# Patient Record
Sex: MALE | Race: WHITE | NOT HISPANIC OR LATINO | Employment: FULL TIME | URBAN - METROPOLITAN AREA
[De-identification: names, ages, dates, MRNs, and addresses within clinical notes are randomized per-mention and may not be internally consistent; named-entity substitution may affect disease eponyms.]

---

## 2024-05-05 NOTE — PROGRESS NOTES
ANNUAL VISIT NOTE     PRESENTING HISTORY     Reason for Visit:  Annual visit.    No chief complaint on file.    History of Present Illness & ROS: Mr. Skip Husain is a 33 y.o. male.  Annual   Very pleasant gentleman.   RN at [employer].   Formerly with [employer], [facility], [facility].   He and his wife are expecting their 2nd son on Thursday.   No complaints.   He tried a 'cleansing' product from [employer] for the 'biliary' issues that was identified on 3/2024 at [facility], and 'feels this has helped and worked well for him'.   In addition, he has not been seen by Cardiology at [facility]. Was evaluated at [facility], but has not undergone stress testing. He has not been consistent with taking the Crestor, due to concerns for 'side effects, but has been taking the CoQ10' tablets.   Denies need for refills at this time.   Reports having '2 weeks remaining and will request refill when needing more'..     Review of Systems:  Eyes: denies visual changes at this time denies floaters   ENT: no nasal congestion or sore throat  Respiratory: no cough or shorness of breath  Cardiovascular: no chest pain or palpitations  Gastrointestinal: no nausea or vomiting, no abdominal pain or change in bowel habits  Genitourinary: no hematuria or dysuria; denies frequency  Hematologic/Lymphatic: no easy bruising or lymphadenopathy  Musculoskeletal: no arthralgias or myalgias  Neurological: no seizures or tremors  Endocrine: no heat or cold intolerance    PAST HISTORY:     No past medical history on file.    No past surgical history on file.    No family history on file.    Social History     Socioeconomic History    Marital status:      Social Determinants of Health     Financial Resource Strain: Low Risk  (10/2/2023)    Received from [facility]    Overall Financial Resource Strain (CARDIA)     Difficulty of Paying Living Expenses: Not hard at all   Food Insecurity: No Food Insecurity (10/2/2023)    Received from [facility]    Hunger  Vital Sign     Worried About Running Out of Food in the Last Year: Never true     Ran Out of Food in the Last Year: Never true   Transportation Needs: No Transportation Needs (10/2/2023)    Received from [facility]    PRAPARE - Transportation     Lack of Transportation (Medical): No     Lack of Transportation (Non-Medical): No   Physical Activity: Sufficiently Active (10/2/2023)    Received from [facility]    Exercise Vital Sign     Days of Exercise per Week: 4 days     Minutes of Exercise per Session: 60 min   Stress: No Stress Concern Present (10/2/2023)    Received from [facility]    [employer] [place] [employer] - Occupational Stress Questionnaire     Feeling of Stress : Not at all       MEDICATIONS & ALLERGIES:     No current outpatient medications on file prior to visit.     No current facility-administered medications on file prior to visit.        Review of patient's allergies indicates:  Not on File    Medications Reconciliation:   I have reconciled the patient's home medications and discharge medications with the patient/family. I have updated all changes.  Refer to After-Visit Medication List.    OBJECTIVE:     Vital Signs:  There were no vitals filed for this visit.  Wt Readings from Last 3 Encounters:   No data found for Wt     There is no height or weight on file to calculate BMI.   Wt Readings from Last 3 Encounters:   05/07/24 103.6 kg (228 lb 6.3 oz)     Temp Readings from Last 3 Encounters:   No data found for Temp     BP Readings from Last 3 Encounters:   05/07/24 126/88     Pulse Readings from Last 3 Encounters:   05/07/24 80       Physical Exam:  General: Well developed, well nourished. No distress.  HEENT: Head is normocephalic, atraumatic; ears are normal.   Eyes: Clear conjunctiva.  Neck: Supple, symmetrical neck; trachea midline.  Lungs: Clear to auscultation bilaterally and normal respiratory effort.  Cardiovascular: Heart with regular rate and rhythm. No murmurs, gallops or  rubs  Extremities: No LE edema. Pulses 2+ and symmetric.   Abdomen: Abdomen is soft, non-tender non-distended with normal bowel sounds.  Skin: Skin color, texture, turgor normal. No rashes.  Musculoskeletal: Normal gait.   Neurologic: Normal strength and tone. No focal numbness or weakness.     Laboratory  Lab Results   Component Value Date    CHOL 197 07/15/2021    TRIG 96 07/15/2021    HDL 38 (L) 07/15/2021    ALT 43 11/08/2022    AST 23 11/08/2022     11/08/2022    K 4.4 11/08/2022    CREATININE 1.00 11/08/2022    BUN 15.0 11/08/2022    CO2 24 11/08/2022    TSH 2.64 08/02/2023    HGBA1C 5.40 08/02/2023       ASSESSMENT & PLAN:     Annual physical exam  -     Comprehensive Metabolic Panel; Future; Expected date: 05/07/2024  -     CBC Auto Differential; Future; Expected date: 05/07/2024  -     Lipid Panel; Future; Expected date: 05/07/2024  -     Hepatitis C Antibody; Future; Expected date: 05/07/2024  -     Hemoglobin A1C; Future; Expected date: 05/07/2024  -     TSH; Future; Expected date: 05/07/2024  -     Vitamin D; Future; Expected date: 05/07/2024  -     Ambulatory referral/consult to Cardiology; Future; Expected date: 05/14/2024      Biliary calculus of other site without obstruction  Cholelithiasis with Cholecystitis, symptomatic, as recent as of 2024:   *Noted seen and addressed by [facility] provider on 3/13/2024  *Declines Surgery at this time   -     Comprehensive Metabolic Panel; Future; Expected date: 05/07/2024  -     Ambulatory referral/consult to General Surgery; Future; Expected date: 05/14/2024    Former smoker  History of hyperlipidemia  High coronary artery calcium score  Elevated coronary artery calcium score  HLP / Elevated CT Score on OP study noted in documentation on 3/13/2024 per [facility] ([facility] Provider)  *Referred to [facility] Cardiologist (apt noted for in 10/2024)  *Study note in 8/2023:  Calcium Score: 96, placing in the 90th% of having an acute coronary event  ` check Lipids  (fasting) today  ` Tricor  ` Crestor   -     Lipid Panel; Future; Expected date: 05/07/2024  -     Ambulatory referral/consult to Cardiology; Future; Expected date: 05/14/2024  -     rosuvastatin (CRESTOR) 20 MG tablet; Take 1 tablet (20 mg total) by mouth once daily.  -     fenofibrate (TRICOR) 48 MG tablet; Take 1 tablet (48 mg total) by mouth once daily.  Dispense: 90 tablet; Refill: 1    Primary hypertension  Today: 126/88  BP Readings from Last 3 Encounters:   05/07/24 126/88   -     losartan (COZAAR) 50 MG tablet; Take 1 tablet (50 mg total) by mouth once daily.    Former Smoker:   Quit 12/2019      * Annual PE today and will follow up with one of our IM Physician Providers to be considered est'd in medical care with practice site.         Medication List            Accurate as of May 7, 2024 10:01 AM. If you have any questions, ask your nurse or doctor.                CONTINUE taking these medications      fenofibrate 48 MG tablet  Commonly known as: TRICOR  Take 1 tablet (48 mg total) by mouth once daily.     losartan 50 MG tablet  Commonly known as: COZAAR  Take 1 tablet (50 mg total) by mouth once daily.     rosuvastatin 20 MG tablet  Commonly known as: CRESTOR  Take 1 tablet (20 mg total) by mouth once daily.               Where to Get Your Medications        These medications were sent to [facility]  [address] [place] [id number]      Hours: Always Open Phone: [phone]   fenofibrate 48 MG tablet       Information about where to get these medications is not yet available    Ask your nurse or doctor about these medications  losartan 50 MG tablet  rosuvastatin 20 MG tablet         Signing Physician:  REGINALD Valadez

## 2024-05-07 ENCOUNTER — OFFICE VISIT (OUTPATIENT)
Dept: INTERNAL MEDICINE | Facility: CLINIC | Age: 34
End: 2024-05-07
Payer: COMMERCIAL

## 2024-05-07 ENCOUNTER — PATIENT MESSAGE (OUTPATIENT)
Dept: INTERNAL MEDICINE | Facility: CLINIC | Age: 34
End: 2024-05-07

## 2024-05-07 ENCOUNTER — TELEPHONE (OUTPATIENT)
Dept: INTERNAL MEDICINE | Facility: CLINIC | Age: 34
End: 2024-05-07

## 2024-05-07 ENCOUNTER — LAB VISIT (OUTPATIENT)
Dept: LAB | Facility: HOSPITAL | Age: 34
End: 2024-05-07
Payer: COMMERCIAL

## 2024-05-07 VITALS
BODY MASS INDEX: 32.69 KG/M2 | OXYGEN SATURATION: 99 % | SYSTOLIC BLOOD PRESSURE: 126 MMHG | HEIGHT: 70 IN | DIASTOLIC BLOOD PRESSURE: 88 MMHG | WEIGHT: 228.38 LBS | HEART RATE: 80 BPM

## 2024-05-07 DIAGNOSIS — Z87.891 FORMER SMOKER: ICD-10-CM

## 2024-05-07 DIAGNOSIS — I10 PRIMARY HYPERTENSION: ICD-10-CM

## 2024-05-07 DIAGNOSIS — R93.1 ELEVATED CORONARY ARTERY CALCIUM SCORE: ICD-10-CM

## 2024-05-07 DIAGNOSIS — Z00.00 ANNUAL PHYSICAL EXAM: ICD-10-CM

## 2024-05-07 DIAGNOSIS — R93.1 HIGH CORONARY ARTERY CALCIUM SCORE: ICD-10-CM

## 2024-05-07 DIAGNOSIS — Z00.00 ANNUAL PHYSICAL EXAM: Primary | ICD-10-CM

## 2024-05-07 DIAGNOSIS — D64.9 ANEMIA, UNSPECIFIED TYPE: Primary | ICD-10-CM

## 2024-05-07 DIAGNOSIS — K80.80 BILIARY CALCULUS OF OTHER SITE WITHOUT OBSTRUCTION: ICD-10-CM

## 2024-05-07 DIAGNOSIS — Z86.39 HISTORY OF HYPERLIPIDEMIA: ICD-10-CM

## 2024-05-07 PROBLEM — K80.20 CHOLELITHIASIS: Status: ACTIVE | Noted: 2024-05-07

## 2024-05-07 LAB
25(OH)D3+25(OH)D2 SERPL-MCNC: 27 NG/ML (ref 30–96)
ALBUMIN SERPL BCP-MCNC: 4.4 G/DL (ref 3.5–5.2)
ALP SERPL-CCNC: 18 U/L (ref 55–135)
ALT SERPL W/O P-5'-P-CCNC: 38 U/L (ref 10–44)
ANION GAP SERPL CALC-SCNC: 10 MMOL/L (ref 8–16)
AST SERPL-CCNC: 30 U/L (ref 10–40)
BASOPHILS # BLD AUTO: 0.05 K/UL (ref 0–0.2)
BASOPHILS NFR BLD: 0.8 % (ref 0–1.9)
BILIRUB SERPL-MCNC: 0.4 MG/DL (ref 0.1–1)
BUN SERPL-MCNC: 14 MG/DL (ref 6–20)
CALCIUM SERPL-MCNC: 9.8 MG/DL (ref 8.7–10.5)
CHLORIDE SERPL-SCNC: 107 MMOL/L (ref 95–110)
CHOLEST SERPL-MCNC: 129 MG/DL (ref 120–199)
CHOLEST/HDLC SERPL: 3.6 {RATIO} (ref 2–5)
CO2 SERPL-SCNC: 23 MMOL/L (ref 23–29)
CREAT SERPL-MCNC: 1.1 MG/DL (ref 0.5–1.4)
DIFFERENTIAL METHOD BLD: ABNORMAL
EOSINOPHIL # BLD AUTO: 0.1 K/UL (ref 0–0.5)
EOSINOPHIL NFR BLD: 2.3 % (ref 0–8)
ERYTHROCYTE [DISTWIDTH] IN BLOOD BY AUTOMATED COUNT: 13 % (ref 11.5–14.5)
EST. GFR  (NO RACE VARIABLE): >60 ML/MIN/1.73 M^2
ESTIMATED AVG GLUCOSE: 108 MG/DL (ref 68–131)
GLUCOSE SERPL-MCNC: 93 MG/DL (ref 70–110)
HBA1C MFR BLD: 5.4 % (ref 4–5.6)
HCT VFR BLD AUTO: 37.5 % (ref 40–54)
HCV AB SERPL QL IA: NORMAL
HDLC SERPL-MCNC: 36 MG/DL (ref 40–75)
HDLC SERPL: 27.9 % (ref 20–50)
HGB BLD-MCNC: 12.1 G/DL (ref 14–18)
IMM GRANULOCYTES # BLD AUTO: 0.04 K/UL (ref 0–0.04)
IMM GRANULOCYTES NFR BLD AUTO: 0.7 % (ref 0–0.5)
LDLC SERPL CALC-MCNC: 72.6 MG/DL (ref 63–159)
LYMPHOCYTES # BLD AUTO: 1.9 K/UL (ref 1–4.8)
LYMPHOCYTES NFR BLD: 31.4 % (ref 18–48)
MCH RBC QN AUTO: 28.7 PG (ref 27–31)
MCHC RBC AUTO-ENTMCNC: 32.3 G/DL (ref 32–36)
MCV RBC AUTO: 89 FL (ref 82–98)
MONOCYTES # BLD AUTO: 0.4 K/UL (ref 0.3–1)
MONOCYTES NFR BLD: 6.5 % (ref 4–15)
NEUTROPHILS # BLD AUTO: 3.5 K/UL (ref 1.8–7.7)
NEUTROPHILS NFR BLD: 58.3 % (ref 38–73)
NONHDLC SERPL-MCNC: 93 MG/DL
NRBC BLD-RTO: 0 /100 WBC
PLATELET # BLD AUTO: 314 K/UL (ref 150–450)
PMV BLD AUTO: 10.3 FL (ref 9.2–12.9)
POTASSIUM SERPL-SCNC: 4.4 MMOL/L (ref 3.5–5.1)
PROT SERPL-MCNC: 7.3 G/DL (ref 6–8.4)
RBC # BLD AUTO: 4.22 M/UL (ref 4.6–6.2)
SODIUM SERPL-SCNC: 140 MMOL/L (ref 136–145)
TRIGL SERPL-MCNC: 102 MG/DL (ref 30–150)
TSH SERPL DL<=0.005 MIU/L-ACNC: 1.27 UIU/ML (ref 0.4–4)
WBC # BLD AUTO: 5.98 K/UL (ref 3.9–12.7)

## 2024-05-07 PROCEDURE — 3074F SYST BP LT 130 MM HG: CPT | Mod: CPTII,S$GLB,, | Performed by: NURSE PRACTITIONER

## 2024-05-07 PROCEDURE — 99999 PR PBB SHADOW E&M-NEW PATIENT-LVL IV: CPT | Mod: PBBFAC,,, | Performed by: NURSE PRACTITIONER

## 2024-05-07 PROCEDURE — 84443 ASSAY THYROID STIM HORMONE: CPT | Performed by: NURSE PRACTITIONER

## 2024-05-07 PROCEDURE — 99385 PREV VISIT NEW AGE 18-39: CPT | Mod: S$GLB,,, | Performed by: NURSE PRACTITIONER

## 2024-05-07 PROCEDURE — 3008F BODY MASS INDEX DOCD: CPT | Mod: CPTII,S$GLB,, | Performed by: NURSE PRACTITIONER

## 2024-05-07 PROCEDURE — 82306 VITAMIN D 25 HYDROXY: CPT | Performed by: NURSE PRACTITIONER

## 2024-05-07 PROCEDURE — 1159F MED LIST DOCD IN RCRD: CPT | Mod: CPTII,S$GLB,, | Performed by: NURSE PRACTITIONER

## 2024-05-07 PROCEDURE — 1160F RVW MEDS BY RX/DR IN RCRD: CPT | Mod: CPTII,S$GLB,, | Performed by: NURSE PRACTITIONER

## 2024-05-07 PROCEDURE — 3079F DIAST BP 80-89 MM HG: CPT | Mod: CPTII,S$GLB,, | Performed by: NURSE PRACTITIONER

## 2024-05-07 PROCEDURE — 83036 HEMOGLOBIN GLYCOSYLATED A1C: CPT | Performed by: NURSE PRACTITIONER

## 2024-05-07 PROCEDURE — 80061 LIPID PANEL: CPT | Performed by: NURSE PRACTITIONER

## 2024-05-07 PROCEDURE — 80053 COMPREHEN METABOLIC PANEL: CPT | Performed by: NURSE PRACTITIONER

## 2024-05-07 PROCEDURE — 86803 HEPATITIS C AB TEST: CPT | Performed by: NURSE PRACTITIONER

## 2024-05-07 PROCEDURE — 4010F ACE/ARB THERAPY RXD/TAKEN: CPT | Mod: CPTII,S$GLB,, | Performed by: NURSE PRACTITIONER

## 2024-05-07 PROCEDURE — 85025 COMPLETE CBC W/AUTO DIFF WBC: CPT | Performed by: NURSE PRACTITIONER

## 2024-05-07 PROCEDURE — 36415 COLL VENOUS BLD VENIPUNCTURE: CPT | Performed by: NURSE PRACTITIONER

## 2024-05-07 RX ORDER — ROSUVASTATIN CALCIUM 20 MG/1
20 TABLET, COATED ORAL DAILY
COMMUNITY
End: 2024-05-07 | Stop reason: SDUPTHER

## 2024-05-07 RX ORDER — ROSUVASTATIN CALCIUM 20 MG/1
20 TABLET, COATED ORAL DAILY
Start: 2024-05-07 | End: 2024-06-03 | Stop reason: SDUPTHER

## 2024-05-07 RX ORDER — LOSARTAN POTASSIUM 50 MG/1
50 TABLET ORAL DAILY
COMMUNITY
End: 2024-05-07 | Stop reason: SDUPTHER

## 2024-05-07 RX ORDER — FENOFIBRATE 145 MG/1
145 TABLET, FILM COATED ORAL DAILY
Start: 2024-05-07 | End: 2024-05-21

## 2024-05-07 RX ORDER — FENOFIBRATE 48 MG/1
48 TABLET, FILM COATED ORAL DAILY
Qty: 90 TABLET | Refills: 1 | Status: SHIPPED | OUTPATIENT
Start: 2024-05-07 | End: 2024-05-07

## 2024-05-07 RX ORDER — FENOFIBRATE 48 MG/1
48 TABLET, FILM COATED ORAL DAILY
COMMUNITY
End: 2024-05-07 | Stop reason: SDUPTHER

## 2024-05-07 RX ORDER — LOSARTAN POTASSIUM 50 MG/1
50 TABLET ORAL DAILY
Start: 2024-05-07 | End: 2024-06-03 | Stop reason: SDUPTHER

## 2024-05-21 ENCOUNTER — OFFICE VISIT (OUTPATIENT)
Dept: CARDIOLOGY | Facility: CLINIC | Age: 34
End: 2024-05-21
Payer: COMMERCIAL

## 2024-05-21 VITALS
HEART RATE: 87 BPM | HEIGHT: 70 IN | DIASTOLIC BLOOD PRESSURE: 83 MMHG | BODY MASS INDEX: 33.17 KG/M2 | WEIGHT: 231.69 LBS | SYSTOLIC BLOOD PRESSURE: 123 MMHG

## 2024-05-21 DIAGNOSIS — Z86.39 HISTORY OF HYPERLIPIDEMIA: ICD-10-CM

## 2024-05-21 DIAGNOSIS — R93.1 ELEVATED CORONARY ARTERY CALCIUM SCORE: ICD-10-CM

## 2024-05-21 PROCEDURE — 4010F ACE/ARB THERAPY RXD/TAKEN: CPT | Mod: CPTII,S$GLB,, | Performed by: INTERNAL MEDICINE

## 2024-05-21 PROCEDURE — 3074F SYST BP LT 130 MM HG: CPT | Mod: CPTII,S$GLB,, | Performed by: INTERNAL MEDICINE

## 2024-05-21 PROCEDURE — 93000 ELECTROCARDIOGRAM COMPLETE: CPT | Mod: S$GLB,,, | Performed by: INTERNAL MEDICINE

## 2024-05-21 PROCEDURE — 3044F HG A1C LEVEL LT 7.0%: CPT | Mod: CPTII,S$GLB,, | Performed by: INTERNAL MEDICINE

## 2024-05-21 PROCEDURE — 99999 PR PBB SHADOW E&M-EST. PATIENT-LVL III: CPT | Mod: PBBFAC,,, | Performed by: INTERNAL MEDICINE

## 2024-05-21 PROCEDURE — 3079F DIAST BP 80-89 MM HG: CPT | Mod: CPTII,S$GLB,, | Performed by: INTERNAL MEDICINE

## 2024-05-21 PROCEDURE — 3008F BODY MASS INDEX DOCD: CPT | Mod: CPTII,S$GLB,, | Performed by: INTERNAL MEDICINE

## 2024-05-21 PROCEDURE — 99203 OFFICE O/P NEW LOW 30 MIN: CPT | Mod: 25,S$GLB,, | Performed by: INTERNAL MEDICINE

## 2024-05-21 PROCEDURE — 1159F MED LIST DOCD IN RCRD: CPT | Mod: CPTII,S$GLB,, | Performed by: INTERNAL MEDICINE

## 2024-05-21 RX ORDER — ASPIRIN 81 MG/1
81 TABLET ORAL DAILY
Start: 2024-05-21 | End: 2025-05-21

## 2024-05-21 NOTE — PROGRESS NOTES
Subjective:   05/21/2024     Patient ID:  Skip Husain is a 33 y.o. male who presents for evaulation of Hypertension       Continued evaluation of coronary artery disease manifested as a coronary calcium score in the 90th percentile.  He has no history of exertional chest pains or tightness, no PND or orthopnea.  He has had fairly marked elevation of triglycerides in the past, greater than 700, but more recent numbers have been in normal ranges on fenofibrate.  He has also been placed on rosuvastatin 20 mg daily, tolerates well.      Hypertension is treated with losartan 50 mg daily.  Blood pressure is mildly elevated.        Past Medical History:   Diagnosis Date    Cholelithiasis     Elevated coronary artery calcium score     Essential (primary) hypertension     Hypertriglyceridemia     Viral meningitis     11/2023       Review of patient's allergies indicates:  No Known Allergies      Current Outpatient Medications:     losartan (COZAAR) 50 MG tablet, Take 1 tablet (50 mg total) by mouth once daily., Disp: , Rfl:     rosuvastatin (CRESTOR) 20 MG tablet, Take 1 tablet (20 mg total) by mouth once daily., Disp: , Rfl:     aspirin (ECOTRIN) 81 MG EC tablet, Take 1 tablet (81 mg total) by mouth once daily., Disp: , Rfl:      Objective:   Review of Systems   Cardiovascular:  Positive for chest pain (Nonexertional). Negative for claudication, cyanosis, dyspnea on exertion, irregular heartbeat, leg swelling, near-syncope, orthopnea, palpitations, paroxysmal nocturnal dyspnea and syncope.         Vitals:    05/21/24 0846   BP: 123/83   Pulse:      Wt Readings from Last 3 Encounters:   05/21/24 105.1 kg (231 lb 11.3 oz)   05/07/24 103.6 kg (228 lb 6.3 oz)     Temp Readings from Last 3 Encounters:   No data found for Temp     BP Readings from Last 3 Encounters:   05/21/24 123/83   05/07/24 126/88     Pulse Readings from Last 3 Encounters:   05/21/24 87   05/07/24 80           Repeat blood pressure 123/83.    Physical  Exam  Vitals reviewed.   Constitutional:       General: He is not in acute distress.     Appearance: He is well-developed.   HENT:      Head: Normocephalic and atraumatic.      Nose: Nose normal.   Eyes:      Conjunctiva/sclera: Conjunctivae normal.      Pupils: Pupils are equal, round, and reactive to light.   Neck:      Vascular: No carotid bruit or JVD.   Cardiovascular:      Rate and Rhythm: Normal rate and regular rhythm.      Pulses: Normal pulses and intact distal pulses.      Heart sounds: Normal heart sounds. No murmur heard.     No friction rub. No gallop.   Pulmonary:      Effort: Pulmonary effort is normal. No respiratory distress.      Breath sounds: Normal breath sounds. No wheezing or rales.   Chest:      Chest wall: No tenderness.   Abdominal:      General: Bowel sounds are normal. There is no distension.      Palpations: Abdomen is soft.      Tenderness: There is no abdominal tenderness.   Musculoskeletal:         General: No tenderness or deformity. Normal range of motion.      Cervical back: Normal range of motion and neck supple.      Right lower leg: No edema.      Left lower leg: No edema.   Skin:     General: Skin is warm and dry.      Findings: No erythema or rash.   Neurological:      Mental Status: He is alert and oriented to person, place, and time.      Cranial Nerves: No cranial nerve deficit.      Motor: No abnormal muscle tone.      Coordination: Coordination normal.   Psychiatric:         Behavior: Behavior normal.         Thought Content: Thought content normal.         Judgment: Judgment normal.           Lab Results   Component Value Date    CHOL 129 05/07/2024    CHOL 197 07/15/2021     Lab Results   Component Value Date    HDL 36 (L) 05/07/2024    HDL 38 (L) 07/15/2021     Lab Results   Component Value Date    LDLCALC 72.6 05/07/2024    LDLCALC 140 (H) 07/15/2021     Lab Results   Component Value Date    ALT 38 05/07/2024    AST 30 05/07/2024    AST 23 11/08/2022    AST 28  07/15/2021     Lab Results   Component Value Date    CREATININE 1.1 05/07/2024    BUN 14 05/07/2024     05/07/2024    K 4.4 05/07/2024    CO2 23 05/07/2024    CO2 24 11/08/2022    CO2 24 07/15/2021     Lab Results   Component Value Date    HGB 12.1 (L) 05/07/2024    HCT 37.5 (L) 05/07/2024               Blood pressures sinus rhythm, normal EKG.            Assessment and Plan:     History of hyperlipidemia  -     Ambulatory referral/consult to Cardiology  -     IN OFFICE EKG 12-LEAD (to Muse)  -     aspirin (ECOTRIN) 81 MG EC tablet; Take 1 tablet (81 mg total) by mouth once daily.    Elevated coronary artery calcium score  -     Ambulatory referral/consult to Cardiology  -     IN OFFICE EKG 12-LEAD (to Muse)  -     aspirin (ECOTRIN) 81 MG EC tablet; Take 1 tablet (81 mg total) by mouth once daily.         Patient did have elevated triglycerides, now quite normal on fenofibrate.  Will ask him to discontinue fenofibrate, continue rosuvastatin 20 mg daily.  He is to undergo an advanced lipid profile at [facility] in 3 months, in the meantime exercising regularly.  I would recommend aspirin 81 mg daily.      Follow up in about 1 year (around 5/21/2025).          No future appointments.

## 2024-05-21 NOTE — PATIENT INSTRUCTIONS
"I recommend the book, "The Obesity Code" for weight loss; it recommends intermittent fasting and avoidance of sugar, artificial sweeteners and refined carbohydrates.    Also, here is information on a Mediterranean type diet including fish, the pesco-mediterranean diet from the American College of Cardiology:    1.  Humans are evolutionarily adapted to obtain calories and nutrients from both plant and animal food sources. Many people overconsume animal products, often-processed meats high in saturated fats and chemical additives. In contrast, while strict veganism has gained popularity for many reasons and has value in certain groups, it can cause nutritional deficiencies (vitamin B12, high-quality proteins, iron, zinc, omega-3 fatty acid, vitamin D, and calcium), and predispose to osteopenia, loss of muscle mass, and anemia. This is not true of a lacto-ovo vegetarian diet, which allows no animal-based food except for eggs and dairy. A 6-year study of [id number] [place] [employer] reported a decreased incidence of all-cause mortality when comparing vegetarians with nonvegetarians. However, when the vegetarians were stratified into vegans, lacto-ovo vegetarians, pesco-vegetarians, and semi-vegetarians, the pesco-vegetarians had lowest risks for all-cause mortality, cardiovascular disease (CVD) mortality, and mortality from other causes.     2.  The authors propose a plant-rich diet rich in nuts with fish and seafood as the principle source of animal food. Known as the Pesco-Mediterranean diet, it is supplemented with extra-virgin olive oil (EVOO), which is the principle fat source, along with moderate amounts of dairy (particularly yogurt and cheese) and eggs, as well as modest amounts of alcohol consumption (ideally red wine with the evening meal), but few red and processed meats.     3.  Both epidemiological studies and randomized clinical trials indicate that the traditional Mediterranean diet is associated with " lower risks for all-cause and CVD mortality, coronary heart disease, metabolic syndrome, diabetes, cognitive decline, neurodegenerative diseases (including Alzheimers), depression, overall cancer mortality, and breast and colorectal cancers.     4.  The traditional Mediterranean diet has been endorsed in the most recent Dietary Guidelines for Americans and the American College of Cardiology/American Heart Association guidelines. The 2020 U.S. News & World Report ranked it #1 for overall health based upon it being nutritious, safe, relatively easy to follow, protective against CVD and diabetes, and effective for weight loss.     5.  Fish and seafood are important sources of vitamins protein and omega-3 fatty acids, of which the higher blood and adipose tissue are associated with reduced fatal and nonfatal myocardial infarction. When not fried, fish consumption has been associated with reduced risk of heart failure, and the incidence of the metabolic syndrome, coronary heart disease, ischemic stroke, and sudden cardiac death, particularly when seafood replaces less healthy foods.     6.  Unrestricted use of olive oil in the kitchen, on salads (with vinegar), cooking vegetables, and at the table is the foundation of the traditional Mediterranean diet, although olive oil quality is crucial, which makes it expensive. EVOO retains hydrophilic components of olives including highly bioactive polyphenols, which are believed to underlie many of EVOOs cardiometabolic benefits, such as reduced low-density lipoprotein cholesterol (LDL-C) and increased high-density lipoprotein cholesterol (HDL-C), improved vascular reactivity, enhanced HDL particle functionality, and a lower diabetes risk.     7.  Tree nuts, an integral component of the traditional Mediterranean diet, are nutrient dense rich in unsaturated fats, fiber, protein, polyphenols, phytosterols, and tocopherols. Nut consumption is associated with decreased incidence  and mortality rates from both CVD and coronary artery disease (CAD), as well as atrial fibrillation and diabetes. Randomized controlled trials have shown that diets enriched with nuts produce cardiometabolic benefits including improvements in insulin sensitivity, LDL-C, inflammation, and vascular reactivity. A 1-daily serving of mixed nuts resulted in a 28% reduction in CVD risk. Generous intake of nuts does not promote weight gain because of increased satiety and incomplete digestion.     8.  Legumes are an excellent source of vegetable protein, folate, and magnesium and fiber, and like other seeds including peanuts, are rich in polyphenols. Consumption of legumes has been linked to a reduced risk of incident and fatal CVD and CAD, as well as improvements in blood glucose, cholesterol, blood pressure, and body weight. Legumes, like fish, are a satiating and healthy substitute for red meat and processed meats.     9.  Dairy products and eggs are important sources of protein, nonsodium minerals, probiotics, and vitamin D. Although there is no clear consensus among nutrition experts on the role of dairy products in CVD risk, they are allowed in this Pesco-Mediterranean diet. Fermented low-fat versions, such as yogurt and soft cheeses, are preferred; butter and hard cheese are high in saturated fats and salt.     10.  Eggs are composed of beneficial nutrients including all essential amino acids, in addition to minerals (selenium, phosphorus, iodine, zinc), vitamins (A, D, B2, B12, niacin), and carotenoids (lutein, zeaxanthin). Although each yolk contains about 184 mg of dietary cholesterol, large prospective cohorts suggest that egg consumption is unrelated to serum cholesterol and does not increase CVD risk. Eggs are allowed in the Pesco-Mediterranean diet; egg whites are unlimited and preferably no more than 5 yolks/week.     11.  Whole grains, such as barley, whole oats, rye, corn, buckwheat, brown rice, and quinoa,  are an integral part of the traditional Mediterranean diet. Pasta is an example of a starchy food that has a low glycemic index despite being a refined carbohydrate. In the context of a low glycemic index dietary pattern such as the Mediterranean diet, pasta does not adversely affect adiposity and may even help reduce body weight and there is no evidence that pasta promotes cardiometabolic risk factors. White rice is associated with type 2 diabetes mellitus in Asians but not in Western cohorts, possibly because it is cooked and served plain in [place] and in Western cultures cooked in mixed dishes with vegetables and vegetable oil including EVOO.     12.  The staple beverage of the Pesco-Mediterranean diet is water--which can be flavored but not sweetened. Unsweetened tea and coffee are rich in antioxidants and are associated with improved CVD outcomes. If alcohol is consumed at all, dry red wine is recommended, with the ideal amount being a single glass (6 oz) for women and 1 or 2 glasses/day for men (6-12 oz) consumed with meals.     13.  Time-restricted eating, a type of intermittent fasting, is the practice of limiting the daily intake of calories to a window of time usually between 6-12 hours each day. Intermittent fasting when done on a regular basis has been shown to decrease intra-abdominal adipose tissue and reduce free-radical production. This elicits powerful cellular responses that improve glucose metabolism and reduce systemic inflammation, and may also reduce risks of diabetes, CVD, cancer, and neurodegenerative diseases. After a 12-hour overnight fast, insulin levels are typically low, and glycogen stores have been depleted. In this fasted state, the body starts mobilizing fatty acids from adipose cells to burn as metabolic fuel instead of glucose. This improves insulin sensitivity. Time-restricted eating is not more effective for weight loss than standard calorie-restriction, but appears to enhance CV  health even in nonobese people. Fasting may also lower blood pressure and resting heart rate and improve autonomic balance with augmented heart rate variability.     14.  The evidence regarding time-restricted eating is mostly based on animal models and observational human studies. The most popular form of time-restricted eating involves eating two rather than three meals and compressing the calorie-consumption window. No head-to-head studies have been performed to assess the optimal time window.

## 2024-05-22 LAB
OHS QRS DURATION: 96 MS
OHS QTC CALCULATION: 415 MS

## 2024-05-24 ENCOUNTER — TELEPHONE (OUTPATIENT)
Dept: CARDIOLOGY | Facility: CLINIC | Age: 34
End: 2024-05-24
Payer: COMMERCIAL

## 2024-06-04 RX ORDER — LOSARTAN POTASSIUM 50 MG/1
50 TABLET ORAL DAILY
Qty: 90 TABLET | Refills: 1 | Status: SHIPPED | OUTPATIENT
Start: 2024-06-04

## 2024-06-04 RX ORDER — ROSUVASTATIN CALCIUM 20 MG/1
20 TABLET, COATED ORAL DAILY
Qty: 90 TABLET | Refills: 1 | Status: SHIPPED | OUTPATIENT
Start: 2024-06-04

## 2024-06-10 ENCOUNTER — LAB VISIT (OUTPATIENT)
Dept: LAB | Facility: HOSPITAL | Age: 34
End: 2024-06-10
Payer: COMMERCIAL

## 2024-06-10 DIAGNOSIS — D64.9 ANEMIA, UNSPECIFIED TYPE: ICD-10-CM

## 2024-06-10 LAB
BASOPHILS # BLD AUTO: 0.05 K/UL (ref 0–0.2)
BASOPHILS NFR BLD: 0.8 % (ref 0–1.9)
DIFFERENTIAL METHOD BLD: ABNORMAL
EOSINOPHIL # BLD AUTO: 0.1 K/UL (ref 0–0.5)
EOSINOPHIL NFR BLD: 1.9 % (ref 0–8)
ERYTHROCYTE [DISTWIDTH] IN BLOOD BY AUTOMATED COUNT: 13 % (ref 11.5–14.5)
FERRITIN SERPL-MCNC: 119 NG/ML (ref 20–300)
HCT VFR BLD AUTO: 37 % (ref 40–54)
HGB BLD-MCNC: 12.3 G/DL (ref 14–18)
IMM GRANULOCYTES # BLD AUTO: 0.02 K/UL (ref 0–0.04)
IMM GRANULOCYTES NFR BLD AUTO: 0.3 % (ref 0–0.5)
IRON SERPL-MCNC: 106 UG/DL (ref 45–160)
LYMPHOCYTES # BLD AUTO: 2.2 K/UL (ref 1–4.8)
LYMPHOCYTES NFR BLD: 33.8 % (ref 18–48)
MCH RBC QN AUTO: 29.2 PG (ref 27–31)
MCHC RBC AUTO-ENTMCNC: 33.2 G/DL (ref 32–36)
MCV RBC AUTO: 88 FL (ref 82–98)
MONOCYTES # BLD AUTO: 0.4 K/UL (ref 0.3–1)
MONOCYTES NFR BLD: 5.8 % (ref 4–15)
NEUTROPHILS # BLD AUTO: 3.7 K/UL (ref 1.8–7.7)
NEUTROPHILS NFR BLD: 57.4 % (ref 38–73)
NRBC BLD-RTO: 0 /100 WBC
PLATELET # BLD AUTO: 263 K/UL (ref 150–450)
PMV BLD AUTO: 10.1 FL (ref 9.2–12.9)
RBC # BLD AUTO: 4.21 M/UL (ref 4.6–6.2)
SATURATED IRON: 25 % (ref 20–50)
TOTAL IRON BINDING CAPACITY: 429 UG/DL (ref 250–450)
TRANSFERRIN SERPL-MCNC: 290 MG/DL (ref 200–375)
VIT B12 SERPL-MCNC: 268 PG/ML (ref 210–950)
WBC # BLD AUTO: 6.4 K/UL (ref 3.9–12.7)

## 2024-06-10 PROCEDURE — 82607 VITAMIN B-12: CPT | Performed by: NURSE PRACTITIONER

## 2024-06-10 PROCEDURE — 82728 ASSAY OF FERRITIN: CPT | Performed by: NURSE PRACTITIONER

## 2024-06-10 PROCEDURE — 85025 COMPLETE CBC W/AUTO DIFF WBC: CPT | Performed by: NURSE PRACTITIONER

## 2024-06-10 PROCEDURE — 83540 ASSAY OF IRON: CPT | Performed by: NURSE PRACTITIONER

## 2024-06-10 PROCEDURE — 36415 COLL VENOUS BLD VENIPUNCTURE: CPT | Performed by: NURSE PRACTITIONER

## 2024-07-24 ENCOUNTER — OFFICE VISIT (OUTPATIENT)
Dept: OPTOMETRY | Facility: CLINIC | Age: 34
End: 2024-07-24
Payer: COMMERCIAL

## 2024-07-24 DIAGNOSIS — H52.203 MYOPIA WITH ASTIGMATISM, BILATERAL: Primary | ICD-10-CM

## 2024-07-24 DIAGNOSIS — H52.13 MYOPIA WITH ASTIGMATISM, BILATERAL: Primary | ICD-10-CM

## 2024-07-24 DIAGNOSIS — Z97.3 WEARS CONTACT LENSES: ICD-10-CM

## 2024-07-24 DIAGNOSIS — Z46.0 FITTING AND ADJUSTMENT OF SPECTACLES AND CONTACT LENSES: Primary | ICD-10-CM

## 2024-07-24 DIAGNOSIS — H43.393 VITREOUS FLOATERS OF BOTH EYES: ICD-10-CM

## 2024-07-24 PROCEDURE — 92004 COMPRE OPH EXAM NEW PT 1/>: CPT | Mod: S$GLB,,,

## 2024-07-24 PROCEDURE — 3044F HG A1C LEVEL LT 7.0%: CPT | Mod: CPTII,S$GLB,,

## 2024-07-24 PROCEDURE — 1159F MED LIST DOCD IN RCRD: CPT | Mod: CPTII,S$GLB,,

## 2024-07-24 PROCEDURE — 99999 PR PBB SHADOW E&M-EST. PATIENT-LVL II: CPT | Mod: PBBFAC,,,

## 2024-07-24 PROCEDURE — 92015 DETERMINE REFRACTIVE STATE: CPT | Mod: S$GLB,,,

## 2024-07-24 PROCEDURE — 4010F ACE/ARB THERAPY RXD/TAKEN: CPT | Mod: CPTII,S$GLB,,

## 2024-07-24 PROCEDURE — 92310 CONTACT LENS FITTING OU: CPT | Mod: CSM,S$GLB,,

## 2024-07-24 PROCEDURE — 99499 UNLISTED E&M SERVICE: CPT | Mod: ,,,

## 2024-07-24 NOTE — PROGRESS NOTES
HPI    NP here for annual exam w/CL. RUEL - 2-3 yrs    Pt states VA is stable has not changed over the past few yrs. Wears   [employer] monthly cl's. Does not sleep in them. Would like to establish   care and get new rx. No gtts currently. Denies flashes, h/o floaters.  Last edited by Krissy De Los Santos, OD on 7/24/2024 10:02 AM.            Assessment /Plan     For exam results, see Encounter Report.    Myopia with astigmatism, bilateral    Wears contact lenses    Vitreous floaters of both eyes      Discussed spectacle options with pt and released final spec rx. Ed pt on change in rx and adaptation.  Updated pt's contact lens rx. Good vision, fit, and comfort with current lens modality. Reviewed importance of good contact lens hygiene, routine monthly replacement of lenses, and to never sleep in lenses. Pt knows to call or message if any issues arise.  Moderate myopia. No holes, tears,  OD, OS on Optos images. Ed pt on benign nature of vitreous floaters. Reviewed signs and symptoms of a retinal detachment thoroughly and ed pt to RTC asap if experienced.    *Optos done*    RTC: 1 year for comprehensive exam or sooner prn

## 2024-08-11 ENCOUNTER — PATIENT MESSAGE (OUTPATIENT)
Dept: OPTOMETRY | Facility: CLINIC | Age: 34
End: 2024-08-11
Payer: COMMERCIAL

## 2024-10-03 LAB
APO B SERPL-MCNC: NORMAL MG/DL
CHOLEST SERPL-MCNC: NORMAL MG/DL
CHOLEST/HDLC SERPL: 3.9 CALC
HDL ALPHA 3 SER-SCNC: NORMAL NMOL/L
HDLC SERPL-MCNC: NORMAL MG/DL
HLD.LARGE SERPL-SCNC: NORMAL UMOL/L
LDL SERPL QN: NORMAL
LDL SERPL-SCNC: NORMAL NMOL/L
LDL SMALL SERPL-SCNC: NORMAL NMOL/L
LDLC REAL SIZE PAT SERPL: NORMAL
LDLC SERPL CALC-MCNC: 78 MG/DL (CALC)
LPA SERPL-SCNC: NORMAL NMOL/L
NONHDLC SERPL-MCNC: 109 MG/DL (CALC)
TRIGL SERPL-MCNC: NORMAL MG/DL

## 2024-10-14 DIAGNOSIS — E78.2 MIXED HYPERLIPIDEMIA: Primary | ICD-10-CM

## 2024-10-14 DIAGNOSIS — E78.41 ELEVATED LIPOPROTEIN(A): ICD-10-CM

## 2024-10-14 RX ORDER — EZETIMIBE 10 MG/1
10 TABLET ORAL DAILY
Qty: 90 TABLET | Refills: 3 | Status: SHIPPED | OUTPATIENT
Start: 2024-10-14 | End: 2024-10-15 | Stop reason: SDUPTHER

## 2024-10-14 RX ORDER — ROSUVASTATIN CALCIUM 40 MG/1
40 TABLET, COATED ORAL NIGHTLY
Qty: 90 TABLET | Refills: 3 | Status: SHIPPED | OUTPATIENT
Start: 2024-10-14 | End: 2024-10-15 | Stop reason: SDUPTHER

## 2024-10-15 ENCOUNTER — PATIENT MESSAGE (OUTPATIENT)
Dept: CARDIOLOGY | Facility: CLINIC | Age: 34
End: 2024-10-15
Payer: COMMERCIAL

## 2024-10-15 DIAGNOSIS — E78.2 MIXED HYPERLIPIDEMIA: ICD-10-CM

## 2024-10-15 RX ORDER — ROSUVASTATIN CALCIUM 40 MG/1
40 TABLET, COATED ORAL NIGHTLY
Qty: 90 TABLET | Refills: 3 | Status: SHIPPED | OUTPATIENT
Start: 2024-10-15 | End: 2025-10-15

## 2024-10-15 RX ORDER — EZETIMIBE 10 MG/1
10 TABLET ORAL DAILY
Qty: 90 TABLET | Refills: 3 | Status: SHIPPED | OUTPATIENT
Start: 2024-10-15 | End: 2025-10-15

## 2024-10-24 ENCOUNTER — PATIENT MESSAGE (OUTPATIENT)
Dept: INTERNAL MEDICINE | Facility: CLINIC | Age: 34
End: 2024-10-24
Payer: COMMERCIAL

## 2024-10-31 ENCOUNTER — OFFICE VISIT (OUTPATIENT)
Dept: INTERNAL MEDICINE | Facility: CLINIC | Age: 34
End: 2024-10-31
Payer: COMMERCIAL

## 2024-10-31 DIAGNOSIS — Z71.85 VACCINE COUNSELING: Primary | ICD-10-CM

## 2024-10-31 DIAGNOSIS — R51.9 FREQUENT HEADACHES: ICD-10-CM

## 2024-10-31 PROCEDURE — 4010F ACE/ARB THERAPY RXD/TAKEN: CPT | Mod: CPTII,95,, | Performed by: NURSE PRACTITIONER

## 2024-10-31 PROCEDURE — 1159F MED LIST DOCD IN RCRD: CPT | Mod: CPTII,95,, | Performed by: NURSE PRACTITIONER

## 2024-10-31 PROCEDURE — 3044F HG A1C LEVEL LT 7.0%: CPT | Mod: CPTII,95,, | Performed by: NURSE PRACTITIONER

## 2024-10-31 PROCEDURE — 99213 OFFICE O/P EST LOW 20 MIN: CPT | Mod: 95,,, | Performed by: NURSE PRACTITIONER

## 2024-10-31 PROCEDURE — 1160F RVW MEDS BY RX/DR IN RCRD: CPT | Mod: CPTII,95,, | Performed by: NURSE PRACTITIONER

## 2024-11-05 ENCOUNTER — TELEPHONE (OUTPATIENT)
Dept: INTERNAL MEDICINE | Facility: CLINIC | Age: 34
End: 2024-11-05
Payer: COMMERCIAL

## 2024-11-05 ENCOUNTER — PATIENT MESSAGE (OUTPATIENT)
Dept: INTERNAL MEDICINE | Facility: CLINIC | Age: 34
End: 2024-11-05
Payer: COMMERCIAL

## 2024-11-05 DIAGNOSIS — A87.9 VIRAL MENINGITIS, UNSPECIFIED: ICD-10-CM

## 2024-11-05 DIAGNOSIS — Z71.85 VACCINE COUNSELING: Primary | ICD-10-CM

## 2024-11-05 DIAGNOSIS — T50.Z95A SIDE EFFECTS OF VACCINATION, INITIAL ENCOUNTER: ICD-10-CM

## 2024-11-05 NOTE — TELEPHONE ENCOUNTER
----- Message from Elayne sent at 11/5/2024  9:08 AM CST -----  Contact: patient [phone]  [email or web address]     Patient is calling for Medical Advice regarding:patient returned call- Please call back    How long has patient had these symptoms:    Pharmacy name and phone#:    Patient [facility] a call back or thru [facility]:    Comments:    Please advise patient replies from provider may take up to 48 hours.

## 2024-11-05 NOTE — TELEPHONE ENCOUNTER
Called to speak with Skip this morning. No answer. Brief voice-message with request for return call to [phone].   Referral has been placed to ID with drafted letter to [facility] at [facility] in regards to Flu exemption for [id number] year.     ` SDJ

## 2024-11-26 ENCOUNTER — LAB VISIT (OUTPATIENT)
Dept: LAB | Facility: HOSPITAL | Age: 34
End: 2024-11-26
Attending: INTERNAL MEDICINE
Payer: COMMERCIAL

## 2024-11-26 ENCOUNTER — PATIENT MESSAGE (OUTPATIENT)
Dept: CARDIOLOGY | Facility: CLINIC | Age: 34
End: 2024-11-26
Payer: COMMERCIAL

## 2024-11-26 DIAGNOSIS — E78.2 MIXED HYPERLIPIDEMIA: ICD-10-CM

## 2024-11-26 LAB
CHOLEST SERPL-MCNC: 94 MG/DL (ref 120–199)
CHOLEST/HDLC SERPL: 2.8 {RATIO} (ref 2–5)
HDLC SERPL-MCNC: 34 MG/DL (ref 40–75)
HDLC SERPL: 36.2 % (ref 20–50)
LDLC SERPL CALC-MCNC: 36.8 MG/DL (ref 63–159)
NONHDLC SERPL-MCNC: 60 MG/DL
TRIGL SERPL-MCNC: 116 MG/DL (ref 30–150)

## 2024-11-26 PROCEDURE — 36415 COLL VENOUS BLD VENIPUNCTURE: CPT | Performed by: INTERNAL MEDICINE

## 2024-11-26 PROCEDURE — 82172 ASSAY OF APOLIPOPROTEIN: CPT | Performed by: INTERNAL MEDICINE

## 2024-11-26 PROCEDURE — 80061 LIPID PANEL: CPT | Performed by: INTERNAL MEDICINE

## 2024-11-27 ENCOUNTER — PATIENT MESSAGE (OUTPATIENT)
Dept: CARDIOLOGY | Facility: HOSPITAL | Age: 34
End: 2024-11-27
Payer: COMMERCIAL

## 2024-11-27 LAB — APO B SERPL-MCNC: 56 MG/DL

## 2024-11-28 ENCOUNTER — PATIENT MESSAGE (OUTPATIENT)
Dept: CARDIOLOGY | Facility: CLINIC | Age: 34
End: 2024-11-28
Payer: COMMERCIAL

## 2024-11-28 PROBLEM — E78.41 ELEVATED LIPOPROTEIN(A): Chronic | Status: ACTIVE | Noted: 2024-11-28

## 2024-11-28 PROBLEM — E78.41 ELEVATED LIPOPROTEIN(A): Status: ACTIVE | Noted: 2024-11-28

## 2024-11-28 PROBLEM — E78.2 MIXED HYPERLIPIDEMIA: Chronic | Status: ACTIVE | Noted: 2024-11-28

## 2024-11-28 PROBLEM — E78.2 MIXED HYPERLIPIDEMIA: Status: ACTIVE | Noted: 2024-11-28

## 2024-11-28 PROBLEM — I10 PRIMARY HYPERTENSION: Chronic | Status: ACTIVE | Noted: 2024-05-07

## 2024-11-28 PROBLEM — R93.1 ELEVATED CORONARY ARTERY CALCIUM SCORE: Chronic | Status: ACTIVE | Noted: 2024-05-07

## 2024-11-28 PROBLEM — Z87.891 FORMER SMOKER: Status: ACTIVE | Noted: 2020-01-21

## 2024-12-04 ENCOUNTER — OFFICE VISIT (OUTPATIENT)
Dept: INFECTIOUS DISEASES | Facility: CLINIC | Age: 34
End: 2024-12-04
Payer: COMMERCIAL

## 2024-12-04 DIAGNOSIS — Z71.85 VACCINE COUNSELING: ICD-10-CM

## 2024-12-04 DIAGNOSIS — A87.9 VIRAL MENINGITIS, UNSPECIFIED: Primary | ICD-10-CM

## 2024-12-04 DIAGNOSIS — T50.Z95A SIDE EFFECTS OF VACCINATION, INITIAL ENCOUNTER: ICD-10-CM

## 2024-12-04 DIAGNOSIS — I10 PRIMARY HYPERTENSION: Chronic | ICD-10-CM

## 2024-12-04 DIAGNOSIS — E78.2 MIXED HYPERLIPIDEMIA: Chronic | ICD-10-CM

## 2024-12-04 NOTE — PROGRESS NOTES
"The patient location is: [place]   The chief complaint leading to consultation is: Concern receiving vaccinations after being told he had viral meningitis last year      Visit type: audiovisual     Notes:     Subjective:     HPI: 33 yo M with pertinent PMHx of   Diagnosed with rhinovirus/enterovirus 2 weeks after flu shot. Head felt congested. At ER no correlation discussed between flu shot and meningitis. Patient declined LP. Given supportive care. Then developed GI bug. Has received flu shot for about 14 years without incident. Current concern is getting meningitis again. We discussed that the timeline of symptom development supports community acquired meningitis/encephalitis. Likely caught rhinovirus from a sick contact and then developed neurologic symptoms. Over time our body clears the virus without antiviral therapy. No guarantee it ever occurs again and no clear correlation with vaccination. I did advise he avoid flu shot in the future if he ever feels ill again because it can make exacerbate acute illness. 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 I agree with exemption from flu shot this year given the meningitis occurred a year ago. Would definitely consider receiving it again next year if he feels well otherwise. Patient voiced understanding.     PCP note reviewed: "Concerns with getting Flu Vaccine 2025 due to risk of 2023 symptoms:   *Of note from medical records, he received the flu vaccine on 10/26/2023 [place] [facility] done) possible viral meningitis, negative flu and Covid at the time, but with + viral panel for Rhinovirus and Enterovirus. However, no notations made in regards to the 'Flu' vaccine being possible culprit or playing a role from the ED team.   Given his concerns, offered to be tested for FLU and COVID pre vaccine, that he is planning and considering to have done next Monday. He was in agreement to ascertain not positive, " "based on current symptom of 'headache', pre vaccination. He and I discussed, candidly, the risk the benefits and the possibilities. Studies, as he and I have chatted about, are not conclusive, but more so towards evidence based case studies confirmed of Guillain [place].   He was in agreement with having the swabs done in an effort to support his decision in proceeding with Flu Vaccine for [id number]. Offered him a referral to discuss more at length with an ID expert, but declines at this time."      Review of Systems:   Negative unless otherwise noted positive-  Gen- Weakness/ Fatigue  Neuro- Confusion  CV- Chest Pain/ Palpitations  : Dysuria/Frequency/Urgency   Resp: Cough/ SOB  GI- Nausea/vomiting  MSK- Arthralgias/myalgias      Objective:     Physical Exam:  General- Patient alert and oriented x3  HEENT- PERRLA, EOMI, OP clear  Resp- No increased WOB noted. Not using accessory muscles.  Extrem- No cyanosis, clubbing, edema.   Skin-  No Jaundice. No visible skin lesions.        Plan:  Viral meningitis  --Diagnosed with enterovirus/rhinovirus Nov 2023 based on respiratory panel  --No LP had been performed  --Explained that likelihood much greater that he developed community acquired illness rather than vaccine-associated   --Did advise avoiding flu shot in future if he feels ill at the time   --Otherwise no contraindication to receiving flu shot beginning next year   --Follow up with ID as needed    HLD  Continue current medications and follow up with PCP      HTN  Continue current medications and follow up with PCP        Face to Face time with patient: 11 minutes   25 minutes of total time spent on the encounter, which includes face to face time and non-face to face time preparing to see the patient (eg, review of tests), Obtaining and/or reviewing separately obtained history, Documenting clinical information in the electronic or other health record, Independently interpreting results (not separately reported) " and communicating results to the patient/family/caregiver, or Care coordination (not separately reported).       Each patient to whom he or she provides medical services by telemedicine is:  (1) informed of the relationship between the physician and patient and the respective role of any other health care provider with respect to management of the patient; and (2) notified that he or she may decline to receive medical services by telemedicine and may withdraw from such care at any time.

## 2024-12-12 ENCOUNTER — PATIENT MESSAGE (OUTPATIENT)
Dept: RESEARCH | Facility: HOSPITAL | Age: 34
End: 2024-12-12
Payer: COMMERCIAL

## 2024-12-19 RX ORDER — LOSARTAN POTASSIUM 50 MG/1
50 TABLET ORAL DAILY
Qty: 90 TABLET | Refills: 1 | Status: SHIPPED | OUTPATIENT
Start: 2024-12-19

## 2024-12-19 NOTE — TELEPHONE ENCOUNTER
Refill Routing Note   Medication(s) are not appropriate for processing by [facility] for the following reason(s):        Responsible provider unclear    [facility] action(s):  Route             Appointments  past 12m or future 3m with PCP    Date Provider   Last Visit   10/31/2024 Tamy Christopher FNP   Next Visit   Visit date not found Tamy Christopher FNP   ED visits in past 90 days: 0        Note composed:8:31 AM 12/19/2024

## 2025-07-07 ENCOUNTER — PATIENT MESSAGE (OUTPATIENT)
Dept: INTERNAL MEDICINE | Facility: CLINIC | Age: 35
End: 2025-07-07
Payer: COMMERCIAL

## 2025-07-07 RX ORDER — LOSARTAN POTASSIUM 50 MG/1
50 TABLET ORAL DAILY
Qty: 90 TABLET | Refills: 0 | Status: SHIPPED | OUTPATIENT
Start: 2025-07-07

## 2025-07-07 NOTE — TELEPHONE ENCOUNTER
Copied from CRM #[id number]. Topic: General Inquiry - Patient Advice  >> Jul 7, 2025  1:55 PM Jojo wrote:  Patient is calling for Medical Advice regarding:Question on medication     Patient wants a call back or thru [facility]:Call back     Comments:Pt would like a call back from nurse in office he has question about medication     Please advise patient replies from provider may take up to 48 hours.

## 2025-07-07 NOTE — TELEPHONE ENCOUNTER
Spoke with the pt and informed that Ms. Morelos will be back in clinic for tomorrow and she will approved his meds.   Pt verbalized understand.

## 2025-07-08 ENCOUNTER — TELEPHONE (OUTPATIENT)
Dept: INTERNAL MEDICINE | Facility: CLINIC | Age: 35
End: 2025-07-08
Payer: COMMERCIAL

## 2025-07-08 ENCOUNTER — LAB VISIT (OUTPATIENT)
Dept: LAB | Facility: HOSPITAL | Age: 35
End: 2025-07-08
Attending: NURSE PRACTITIONER
Payer: COMMERCIAL

## 2025-07-08 DIAGNOSIS — I10 PRIMARY HYPERTENSION: Primary | ICD-10-CM

## 2025-07-08 DIAGNOSIS — Z00.00 ANNUAL PHYSICAL EXAM: ICD-10-CM

## 2025-07-08 DIAGNOSIS — R93.1 HIGH CORONARY ARTERY CALCIUM SCORE: ICD-10-CM

## 2025-07-08 DIAGNOSIS — D64.9 ANEMIA, UNSPECIFIED TYPE: ICD-10-CM

## 2025-07-08 DIAGNOSIS — R51.9 FREQUENT HEADACHES: ICD-10-CM

## 2025-07-08 DIAGNOSIS — I10 PRIMARY HYPERTENSION: ICD-10-CM

## 2025-07-08 DIAGNOSIS — Z86.39 HISTORY OF HYPERLIPIDEMIA: ICD-10-CM

## 2025-07-08 DIAGNOSIS — R93.1 ELEVATED CORONARY ARTERY CALCIUM SCORE: ICD-10-CM

## 2025-07-08 LAB
ABSOLUTE EOSINOPHIL (OHS): 0.08 K/UL
ABSOLUTE MONOCYTE (OHS): 0.33 K/UL (ref 0.3–1)
ABSOLUTE NEUTROPHIL COUNT (OHS): 3.32 K/UL (ref 1.8–7.7)
ALBUMIN SERPL BCP-MCNC: 4.8 G/DL (ref 3.5–5.2)
ALP SERPL-CCNC: 29 UNIT/L (ref 40–150)
ALT SERPL W/O P-5'-P-CCNC: 110 UNIT/L (ref 10–44)
ANION GAP (OHS): 12 MMOL/L (ref 8–16)
AST SERPL-CCNC: 76 UNIT/L (ref 11–45)
BASOPHILS # BLD AUTO: 0.05 K/UL
BASOPHILS NFR BLD AUTO: 0.9 %
BILIRUB SERPL-MCNC: 0.6 MG/DL (ref 0.1–1)
BUN SERPL-MCNC: 16 MG/DL (ref 6–20)
CALCIUM SERPL-MCNC: 10 MG/DL (ref 8.7–10.5)
CHLORIDE SERPL-SCNC: 105 MMOL/L (ref 95–110)
CHOLEST SERPL-MCNC: 112 MG/DL (ref 120–199)
CHOLEST/HDLC SERPL: 3 {RATIO} (ref 2–5)
CO2 SERPL-SCNC: 24 MMOL/L (ref 23–29)
CREAT SERPL-MCNC: 0.9 MG/DL (ref 0.5–1.4)
EAG (OHS): 111 MG/DL (ref 68–131)
ERYTHROCYTE [DISTWIDTH] IN BLOOD BY AUTOMATED COUNT: 12.7 % (ref 11.5–14.5)
FERRITIN SERPL-MCNC: 132 NG/ML (ref 20–300)
GFR SERPLBLD CREATININE-BSD FMLA CKD-EPI: >60 ML/MIN/1.73/M2
GLUCOSE SERPL-MCNC: 101 MG/DL (ref 70–110)
HBA1C MFR BLD: 5.5 % (ref 4–5.6)
HCT VFR BLD AUTO: 40.4 % (ref 40–54)
HDLC SERPL-MCNC: 37 MG/DL (ref 40–75)
HDLC SERPL: 33 % (ref 20–50)
HGB BLD-MCNC: 14 GM/DL (ref 14–18)
IMM GRANULOCYTES # BLD AUTO: 0.02 K/UL (ref 0–0.04)
IMM GRANULOCYTES NFR BLD AUTO: 0.4 % (ref 0–0.5)
IRON SATN MFR SERPL: 24 % (ref 20–50)
IRON SERPL-MCNC: 104 UG/DL (ref 45–160)
LDLC SERPL CALC-MCNC: 30.6 MG/DL (ref 63–159)
LYMPHOCYTES # BLD AUTO: 1.62 K/UL (ref 1–4.8)
MAGNESIUM SERPL-MCNC: 1.8 MG/DL (ref 1.6–2.6)
MCH RBC QN AUTO: 30.2 PG (ref 27–31)
MCHC RBC AUTO-ENTMCNC: 34.7 G/DL (ref 32–36)
MCV RBC AUTO: 87 FL (ref 82–98)
NONHDLC SERPL-MCNC: 75 MG/DL
NUCLEATED RBC (/100WBC) (OHS): 0 /100 WBC
PLATELET # BLD AUTO: 242 K/UL (ref 150–450)
PMV BLD AUTO: 10.4 FL (ref 9.2–12.9)
POTASSIUM SERPL-SCNC: 4.4 MMOL/L (ref 3.5–5.1)
PROT SERPL-MCNC: 7.5 GM/DL (ref 6–8.4)
RBC # BLD AUTO: 4.63 M/UL (ref 4.6–6.2)
RELATIVE EOSINOPHIL (OHS): 1.5 %
RELATIVE LYMPHOCYTE (OHS): 29.9 % (ref 18–48)
RELATIVE MONOCYTE (OHS): 6.1 % (ref 4–15)
RELATIVE NEUTROPHIL (OHS): 61.2 % (ref 38–73)
SODIUM SERPL-SCNC: 141 MMOL/L (ref 136–145)
TIBC SERPL-MCNC: 438 UG/DL (ref 250–450)
TRANSFERRIN SERPL-MCNC: 296 MG/DL (ref 200–375)
TRIGL SERPL-MCNC: 222 MG/DL (ref 30–150)
TSH SERPL-ACNC: 1.78 UIU/ML (ref 0.4–4)
VIT B12 SERPL-MCNC: 456 PG/ML (ref 210–950)
WBC # BLD AUTO: 5.42 K/UL (ref 3.9–12.7)

## 2025-07-08 PROCEDURE — 84466 ASSAY OF TRANSFERRIN: CPT

## 2025-07-08 PROCEDURE — 85025 COMPLETE CBC W/AUTO DIFF WBC: CPT

## 2025-07-08 PROCEDURE — 83036 HEMOGLOBIN GLYCOSYLATED A1C: CPT

## 2025-07-08 PROCEDURE — 82607 VITAMIN B-12: CPT

## 2025-07-08 PROCEDURE — 80053 COMPREHEN METABOLIC PANEL: CPT

## 2025-07-08 PROCEDURE — 82728 ASSAY OF FERRITIN: CPT

## 2025-07-08 PROCEDURE — 83735 ASSAY OF MAGNESIUM: CPT

## 2025-07-08 PROCEDURE — 80061 LIPID PANEL: CPT

## 2025-07-08 PROCEDURE — 84443 ASSAY THYROID STIM HORMONE: CPT

## 2025-07-08 PROCEDURE — 36415 COLL VENOUS BLD VENIPUNCTURE: CPT | Mod: PO

## 2025-07-08 NOTE — PROGRESS NOTES
ANNUAL VISIT NOTE     PRESENTING HISTORY     Reason for Visit:  Annual visit.    No chief complaint on file.      History of Present Illness & ROS: Mr. Skip Husain is a 34 y.o. male.  Annual.   Known to me.   Very pleasant gentleman.   No est'd PCP.   Having some colic right sided abdominal discomfort, more persistent since being on a family trip recently, 'not eating the best and did some drinking'. He is on Crestor therapy, but with a noted history of gallstones.   Also with no trauma, but having some 'low back and neck pain for the past 6 months'. Sitting for prolonged periods aggravates it, standing improves it. No MVAs, injury or falls endorsed. No loss of bowel or bladder function or consistent radiation into the buttock.     Reviewed his labs that have been drawn earlier today.     Review of Systems:  Eyes: denies visual changes at this time denies floaters   ENT: no nasal congestion or sore throat  Respiratory: no cough or shorness of breath  Cardiovascular: no chest pain or palpitations  Genitourinary: no hematuria or dysuria; denies frequency  Hematologic/Lymphatic: no easy bruising or lymphadenopathy  Musculoskeletal: no arthralgias or myalgias  Neurological: no seizures or tremors  Endocrine: no heat or cold intolerance    PAST HISTORY:     Past Medical History:   Diagnosis Date    Cholelithiasis     Elevated coronary artery calcium score     Viral meningitis     11/2023       Past Surgical History:   Procedure Laterality Date    APPENDECTOMY      2014    REPAIR, HERNIA, UMBILICAL      6/2017       Family History   Problem Relation Name Age of Onset    No Known Problems Mother      Hyperlipidemia Father      Heart attack Father      No Known Problems Maternal Grandmother      Hypertension Maternal Grandfather      Hyperlipidemia Maternal Grandfather      Heart disease Maternal Grandfather      Cancer Maternal Grandfather      Stroke Maternal Grandfather      Cholelithiasis Maternal Grandfather       No Known Problems Paternal Grandmother      Diabetes Paternal Grandfather      Heart attack Paternal Grandfather      Glaucoma Neg Hx      Macular degeneration Neg Hx         Social History     Socioeconomic History    Marital status:    Occupational History    Occupation: RN   Tobacco Use    Smoking status: Former     Current packs/day: 0.00     Types: Cigarettes     Quit date: 2019     Years since quittin.6    Smokeless tobacco: Never   Substance and Sexual Activity    Alcohol use: Yes    Drug use: Never    Sexual activity: Yes     Partners: Female   Social History Narrative    RN. Expecting the birth of their 2nd son on 2024.      Social Drivers of Health     Financial Resource Strain: Low Risk  (10/31/2024)    Overall Financial Resource Strain (CARDIA)     Difficulty of Paying Living Expenses: Not hard at all   Food Insecurity: No Food Insecurity (10/31/2024)    Hunger Vital Sign     Worried About Running Out of Food in the Last Year: Never true     Ran Out of Food in the Last Year: Never true   Transportation Needs: No Transportation Needs (2024)    PRAPARE - Transportation     Lack of Transportation (Medical): No     Lack of Transportation (Non-Medical): No   Physical Activity: Insufficiently Active (10/31/2024)    Exercise Vital Sign     Days of Exercise per Week: 3 days     Minutes of Exercise per Session: 30 min   Stress: No Stress Concern Present (10/31/2024)    [place] [employer] - Occupational Stress Questionnaire     Feeling of Stress : Not at all   Housing Stability: Unknown (10/31/2024)    Housing Stability Vital Sign     Unable to Pay for Housing in the Last Year: No       MEDICATIONS & ALLERGIES:     Medications Ordered Prior to Encounter[1]     Review of patient's allergies indicates:  No Known Allergies    Medications Reconciliation:   I have reconciled the patient's home medications and discharge medications with the patient/family. I have updated all  changes.  Refer to After-Visit Medication List.    OBJECTIVE:     Vital Signs:  There were no vitals filed for this visit.  Wt Readings from Last 3 Encounters:   05/21/24 0820 105.1 kg (231 lb 11.3 oz)   05/07/24 0858 103.6 kg (228 lb 6.3 oz)     There is no height or weight on file to calculate BMI.   Wt Readings from Last 3 Encounters:   07/09/25 108.7 kg (239 lb 10.2 oz)   05/21/24 105.1 kg (231 lb 11.3 oz)   05/07/24 103.6 kg (228 lb 6.3 oz)     Temp Readings from Last 3 Encounters:   No data found for Temp     BP Readings from Last 3 Encounters:   07/09/25 132/82   05/21/24 123/83   05/07/24 126/88     Pulse Readings from Last 3 Encounters:   07/09/25 76   05/21/24 87   05/07/24 80       Physical Exam:  General: Well developed, well nourished. No distress.  HEENT: Head is normocephalic, atraumatic; ears are normal.   Eyes: Clear conjunctiva.  Neck: Supple, symmetrical neck; trachea midline.  Lungs: Clear to auscultation bilaterally and normal respiratory effort.  Cardiovascular: Heart with regular rate and rhythm. No murmurs, gallops or rubs  Extremities: No LE edema. Pulses 2+ and symmetric.   Abdomen: + palpably induced voiced tenderness to RUQ upon palpation,soft, non-tender non-distended with normal bowel sounds to remainder of quadrants   Skin: Skin color, texture, turgor normal. No rashes.  Musculoskeletal: Normal gait.   Neurologic: Normal strength and tone. No focal numbness or weakness.   Psychiatric: Not depressed.    Laboratory  Lab Results   Component Value Date    WBC 6.40 06/10/2024    HGB 12.3 (L) 06/10/2024    HCT 37.0 (L) 06/10/2024     06/10/2024    CHOL 94 (L) 11/26/2024    TRIG 116 11/26/2024    HDL 34 (L) 11/26/2024    ALT 38 05/07/2024    AST 30 05/07/2024     05/07/2024    K 4.4 05/07/2024     05/07/2024    CREATININE 1.1 05/07/2024    BUN 14 05/07/2024    CO2 23 05/07/2024    TSH 1.269 05/07/2024    HGBA1C 5.4 05/07/2024       ASSESSMENT & PLAN:     Annual physical  exam    Abdominal pain, colicky  Gallstones  Comments:  3/2023  Orders:  -     Ambulatory referral/consult to General Surgery; Future; Expected date: 07/16/2025  -     US Abdomen Complete; Future; Expected date: 07/09/2025    Muscle spasm, Low Back Pain and C Spine discomforts   -     meloxicam (MOBIC) 15 MG tablet; Take 1 tablet (15 mg total) by mouth daily as needed for Pain.  Dispense: 30 tablet; Refill: 0  -     methocarbamoL (ROBAXIN) 500 MG Tab; Take 1 tablet (500 mg total) by mouth every 6 (six) hours as needed (Muscle Spasm).  Dispense: 30 tablet; Refill: 0  -     Ambulatory referral/consult to General Surgery; Future; Expected date: 07/16/2025  -     US Abdomen Complete; Future; Expected date: 07/09/2025  -     X-Ray Lumbar Spine 5 View; Future; Expected date: 07/09/2025  -     X-Ray Cervical Spine Complete 5 view; Future; Expected date: 07/09/2025    Primary hypertension  Elevated coronary artery calcium score  Former smoker  Elevated lipoprotein(a)  Mixed hyperlipidemia  Family history of heart disease  Today: 132/82 (acceptable)  ` Losartan      Elevated liver enzymes  -     US Abdomen Complete; Future; Expected date: 07/09/2025    Low back pain without sciatica, unspecified back pain laterality, unspecified chronicity  Noted Apolipoprotein B level from 5/2024; Triglycerides have increased over the past year, 222 (116)...  ` Crestor and Zetia  ` continue follow ups with Dr. Graham; seen in 5/2024. Recommending annual.    *Annual today.     Future Appointments   Date Time Provider Department Center   7/9/2025 12:00 PM [facility] 485 LB LIMIT [facility]   7/9/2025 12:15 PM [facility] 485 LB LIMIT [facility]   8/22/2025  2:00 PM Ishan Graham Jr., MD [facility] [place]   8/25/2025  2:00 PM Ishan Graham Jr., MD [facility] [place]        Medication List            Accurate as of July 9, 2025  8:52 AM. If you have any questions, ask your nurse or doctor.                 START taking these medications      meloxicam 15 MG tablet  Commonly known as: MOBIC  Take 1 tablet (15 mg total) by mouth daily as needed for Pain.  Started by: REGINALD Valadez     methocarbamoL 500 MG Tab  Commonly known as: Robaxin  Take 1 tablet (500 mg total) by mouth every 6 (six) hours as needed (Muscle Spasm).  Started by: REGINALD Valadez            CONTINUE taking these medications      aspirin 81 MG EC tablet  Commonly known as: ECOTRIN  Take 1 tablet (81 mg total) by mouth once daily.     ezetimibe 10 mg tablet  Commonly known as: ZETIA  Take 1 tablet (10 mg total) by mouth once daily.     losartan 50 MG tablet  Commonly known as: COZAAR  Take 1 tablet (50 mg total) by mouth once daily.     rosuvastatin 40 MG Tab  Commonly known as: CRESTOR  Take 1 tablet (40 mg total) by mouth every evening.               Where to Get Your Medications        These medications were sent to [facility] [address], [place] [address]      Hours: Always Open Phone: [phone]   meloxicam 15 MG tablet  methocarbamoL 500 MG Tab         Signing Physician:  REGINALD Valadez           [1]   Current Outpatient Medications on File Prior to Visit   Medication Sig Dispense Refill    aspirin (ECOTRIN) 81 MG EC tablet Take 1 tablet (81 mg total) by mouth once daily.      ezetimibe (ZETIA) 10 mg tablet Take 1 tablet (10 mg total) by mouth once daily. 90 tablet 3    losartan (COZAAR) 50 MG tablet Take 1 tablet (50 mg total) by mouth once daily. 90 tablet 0    rosuvastatin (CRESTOR) 40 MG Tab Take 1 tablet (40 mg total) by mouth every evening. 90 tablet 3     No current facility-administered medications on file prior to visit.

## 2025-07-09 ENCOUNTER — HOSPITAL ENCOUNTER (OUTPATIENT)
Dept: RADIOLOGY | Facility: HOSPITAL | Age: 35
Discharge: HOME | End: 2025-07-09
Attending: NURSE PRACTITIONER
Payer: COMMERCIAL

## 2025-07-09 ENCOUNTER — OFFICE VISIT (OUTPATIENT)
Dept: INTERNAL MEDICINE | Facility: CLINIC | Age: 35
End: 2025-07-09
Payer: COMMERCIAL

## 2025-07-09 ENCOUNTER — OFFICE VISIT (OUTPATIENT)
Dept: CARDIOLOGY | Facility: CLINIC | Age: 35
End: 2025-07-09
Payer: COMMERCIAL

## 2025-07-09 ENCOUNTER — TELEPHONE (OUTPATIENT)
Dept: ADMINISTRATIVE | Facility: OTHER | Age: 35
End: 2025-07-09
Payer: COMMERCIAL

## 2025-07-09 VITALS
HEART RATE: 76 BPM | HEIGHT: 70 IN | OXYGEN SATURATION: 98 % | WEIGHT: 239.63 LBS | BODY MASS INDEX: 34.3 KG/M2 | SYSTOLIC BLOOD PRESSURE: 132 MMHG | DIASTOLIC BLOOD PRESSURE: 82 MMHG

## 2025-07-09 DIAGNOSIS — M54.50 LOW BACK PAIN WITHOUT SCIATICA, UNSPECIFIED BACK PAIN LATERALITY, UNSPECIFIED CHRONICITY: ICD-10-CM

## 2025-07-09 DIAGNOSIS — Z82.49 FAMILY HISTORY OF HEART DISEASE: ICD-10-CM

## 2025-07-09 DIAGNOSIS — E78.2 MIXED HYPERLIPIDEMIA: Chronic | ICD-10-CM

## 2025-07-09 DIAGNOSIS — R10.84 ABDOMINAL PAIN, COLICKY: ICD-10-CM

## 2025-07-09 DIAGNOSIS — R93.1 ELEVATED CORONARY ARTERY CALCIUM SCORE: Primary | Chronic | ICD-10-CM

## 2025-07-09 DIAGNOSIS — R74.8 ABNORMAL TRANSAMINASES: ICD-10-CM

## 2025-07-09 DIAGNOSIS — I10 PRIMARY HYPERTENSION: Chronic | ICD-10-CM

## 2025-07-09 DIAGNOSIS — K80.20 GALLSTONES: ICD-10-CM

## 2025-07-09 DIAGNOSIS — R74.8 ELEVATED LIVER ENZYMES: ICD-10-CM

## 2025-07-09 DIAGNOSIS — R93.1 ELEVATED CORONARY ARTERY CALCIUM SCORE: Chronic | ICD-10-CM

## 2025-07-09 DIAGNOSIS — Z00.00 ANNUAL PHYSICAL EXAM: ICD-10-CM

## 2025-07-09 DIAGNOSIS — E78.41 ELEVATED LIPOPROTEIN(A): Chronic | ICD-10-CM

## 2025-07-09 DIAGNOSIS — Z00.00 ANNUAL PHYSICAL EXAM: Primary | ICD-10-CM

## 2025-07-09 DIAGNOSIS — Z87.891 FORMER SMOKER: ICD-10-CM

## 2025-07-09 DIAGNOSIS — M62.838 MUSCLE SPASM: ICD-10-CM

## 2025-07-09 PROCEDURE — 72050 X-RAY EXAM NECK SPINE 4/5VWS: CPT | Mod: TC

## 2025-07-09 PROCEDURE — 4010F ACE/ARB THERAPY RXD/TAKEN: CPT | Mod: CPTII,S$GLB,, | Performed by: NURSE PRACTITIONER

## 2025-07-09 PROCEDURE — 1159F MED LIST DOCD IN RCRD: CPT | Mod: CPTII,S$GLB,, | Performed by: NURSE PRACTITIONER

## 2025-07-09 PROCEDURE — 72110 X-RAY EXAM L-2 SPINE 4/>VWS: CPT | Mod: 26,,, | Performed by: RADIOLOGY

## 2025-07-09 PROCEDURE — 72110 X-RAY EXAM L-2 SPINE 4/>VWS: CPT | Mod: TC

## 2025-07-09 PROCEDURE — 4010F ACE/ARB THERAPY RXD/TAKEN: CPT | Mod: CPTII,S$GLB,, | Performed by: INTERNAL MEDICINE

## 2025-07-09 PROCEDURE — 3075F SYST BP GE 130 - 139MM HG: CPT | Mod: CPTII,S$GLB,, | Performed by: NURSE PRACTITIONER

## 2025-07-09 PROCEDURE — 99395 PREV VISIT EST AGE 18-39: CPT | Mod: S$GLB,,, | Performed by: NURSE PRACTITIONER

## 2025-07-09 PROCEDURE — 3044F HG A1C LEVEL LT 7.0%: CPT | Mod: CPTII,S$GLB,, | Performed by: NURSE PRACTITIONER

## 2025-07-09 PROCEDURE — 99999 PR PBB SHADOW E&M-EST. PATIENT-LVL V: CPT | Mod: PBBFAC,,, | Performed by: NURSE PRACTITIONER

## 2025-07-09 PROCEDURE — 1160F RVW MEDS BY RX/DR IN RCRD: CPT | Mod: CPTII,S$GLB,, | Performed by: NURSE PRACTITIONER

## 2025-07-09 PROCEDURE — 1159F MED LIST DOCD IN RCRD: CPT | Mod: CPTII,S$GLB,, | Performed by: INTERNAL MEDICINE

## 2025-07-09 PROCEDURE — 3008F BODY MASS INDEX DOCD: CPT | Mod: CPTII,S$GLB,, | Performed by: NURSE PRACTITIONER

## 2025-07-09 PROCEDURE — 3079F DIAST BP 80-89 MM HG: CPT | Mod: CPTII,S$GLB,, | Performed by: NURSE PRACTITIONER

## 2025-07-09 PROCEDURE — 99999 PR PBB SHADOW E&M-EST. PATIENT-LVL III: CPT | Mod: PBBFAC,,, | Performed by: INTERNAL MEDICINE

## 2025-07-09 PROCEDURE — 99214 OFFICE O/P EST MOD 30 MIN: CPT | Mod: S$GLB,,, | Performed by: INTERNAL MEDICINE

## 2025-07-09 PROCEDURE — 72050 X-RAY EXAM NECK SPINE 4/5VWS: CPT | Mod: 26,,, | Performed by: RADIOLOGY

## 2025-07-09 PROCEDURE — 3044F HG A1C LEVEL LT 7.0%: CPT | Mod: CPTII,S$GLB,, | Performed by: INTERNAL MEDICINE

## 2025-07-09 RX ORDER — METHOCARBAMOL 500 MG/1
500 TABLET, FILM COATED ORAL EVERY 6 HOURS PRN
Qty: 30 TABLET | Refills: 0 | Status: SHIPPED | OUTPATIENT
Start: 2025-07-09 | End: 2025-07-19

## 2025-07-09 RX ORDER — MELOXICAM 15 MG/1
15 TABLET ORAL DAILY PRN
Qty: 30 TABLET | Refills: 0 | Status: SHIPPED | OUTPATIENT
Start: 2025-07-09

## 2025-07-09 NOTE — PROGRESS NOTES
Subjective:   07/09/2025     Patient ID:  Skip Husain is a 34 y.o. male who presents for evaulation of Annual Exam       History of Present Illness    CHIEF COMPLAINT:  Patient presents for follow-up to discuss recent lab results and ongoing back pain.    HPI:  Patient reports back pain onset after initiating gym activities last month. He began with incline treadmill walks, which were initially well-tolerated. However, after 2 weeks of increasing speed and incline, he developed significant back pain and was diagnosed with an L5 stress fracture. 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 Pain is exacerbated by sitting, walking, and standing, but improves with movement.    He has a history of gallstones, diagnosed in 2024. He had a gallbladder attack with duct obstruction, but intervention was not required at that time. He has an upcoming appointment with a general surgeon to discuss this further, and an US of the gallbladder and liver scheduled for tomorrow.    He mentions elevated LFTs in recent tests.    He has been attempting to improve his exercise routine and joined a gym with his wife.    He denies chest pain or tightness, history of osteoporosis, and dyspnea.    CARDIAC HISTORY:  Coronary disease    MEDICATIONS:  Losartan 50 mg daily for HTN  Atorvastatin 40 mg daily for HLD  CoQ10 with statin Patient is on Acetazolamide 10 mg daily.    TEST RESULTS:  Patient's triglycerides have increased from 116 last year to 222 now. His Hemoglobin A1C was normal on July 9, 2025. Apolipoprotein B was at goal last year at 56. Lipoprotein small A is moderately elevated at 112, with the upper limit of normal being 75. LDL was 37 last year. Patient's liver enzymes are recently elevated. Patient underwent a Cardio IQ test at [facility] last year, which showed Pattern type B. His calcium score is in the 90th percentile.    IMAGING:  An ultrasound in 2024  revealed gallstones.    MEDICAL HISTORY:  Patient has a history of mixed hyperlipidemia and primary hypertension. He was diagnosed with gallstones in 2024. Patient has a possibly chronic L5 stress fracture. He is a former smoker.    FAMILY HISTORY:  Brother: calcium score in 300s at age 38    SOCIAL HISTORY:  Alcohol Use: Consumed alcohol during Fourth of July vacation  Smoking: Former smoker Occupation: Former nurse   History: Former nurse in a helicopter  Marital status:       ROS:  General: -fever, -chills, -fatigue, -weight gain, -weight loss  Eyes: -vision changes, -redness, -discharge  ENT: -ear pain, -nasal congestion, -sore throat  Cardiovascular: -chest pain, -palpitations, -lower extremity edema  Respiratory: -cough, -shortness of breath  Gastrointestinal: +abdominal pain, -nausea, -vomiting, -diarrhea, -constipation, -blood in stool  Genitourinary: -dysuria, -hematuria, -frequency  Musculoskeletal: -joint pain, -muscle pain, +back pain, +pain with movement  Skin: -rash, -lesion  Neurological: -headache, -dizziness, -numbness, -tingling  Psychiatric: -anxiety, -depression, -sleep difficulty          Problem List[1]     Review of patient's allergies indicates:  No Known Allergies    Current Medications[2]     Objective:   Physical Exam    General: No acute distress. Well-developed. Well-nourished.  Eyes: EOMI. Sclerae anicteric.  HENT: Normocephalic. Atraumatic. Nares patent. Moist oral mucosa.  Cardiovascular: Regular rate. Regular rhythm. No murmurs. No rubs. No gallops. Normal S1, S2.  Respiratory: Normal respiratory effort. Clear to auscultation bilaterally. No rales. No rhonchi. No wheezing.  Musculoskeletal: No  obvious deformity.  Extremities: No lower extremity edema.  Neurological: Alert & oriented x3. No slurred speech. Normal gait.  Psychiatric: Normal mood. Normal affect. Good insight. Good judgment.  Skin: Warm. Dry. No rash.          Vitals:    07/09/25 1420   BP: (P) 132/87    Pulse: (P) 86     Wt Readings from Last 3 Encounters:   07/09/25 (P) 108.5 kg (239 lb 3.2 oz)   07/09/25 108.7 kg (239 lb 10.2 oz)   05/21/24 105.1 kg (231 lb 11.3 oz)     Temp Readings from Last 3 Encounters:   No data found for Temp     BP Readings from Last 3 Encounters:   07/09/25 (P) 132/87   07/09/25 132/82   05/21/24 123/83     Pulse Readings from Last 3 Encounters:   07/09/25 (P) 86   07/09/25 76   05/21/24 87               Lab Results   Component Value Date    CHOL 112 (L) 07/08/2025    CHOL 94 (L) 11/26/2024    CHOL 146 10/01/2024     Lab Results   Component Value Date    HDL 37 (L) 07/08/2025    HDL 34 (L) 11/26/2024    HDL 37 (L) 10/01/2024     Lab Results   Component Value Date    LDLCALC 30.6 (L) 07/08/2025    LDLCALC 36.8 (L) 11/26/2024    LDLCALC 78 10/01/2024     Lab Results   Component Value Date     (H) 07/08/2025    AST 76 (H) 07/08/2025    AST 30 05/07/2024    AST 20 03/11/2024     Lab Results   Component Value Date    CREATININE 0.9 07/08/2025    BUN 16 07/08/2025     07/08/2025    K 4.4 07/08/2025    CO2 24 07/08/2025    CO2 23 05/07/2024    CO2 26 03/11/2024     Lab Results   Component Value Date    HGB 14.0 07/08/2025    HCT 40.4 07/08/2025    HCT 37.0 (L) 06/10/2024    HCT 37.5 (L) 05/07/2024       Assessment and Plan:   Assessment & Plan    R93.1 Elevated coronary artery calcium score  E78.41 Elevated lipoprotein(a)  E78.2 Mixed hyperlipidemia  I10 Primary hypertension  Z87.891 Former smoker  R74.8 Abnormal transaminases    IMPRESSION:  - Reviewed history of coronary disease, elevated calcium score, mixed HLD, and primary HTN.  - Recent elevation in triglycerides (222 from 116 last year) and liver enzymes noted.  - Considered fatty liver in differential diagnosis for elevated liver enzymes.  - Cholesterol management evaluated, noting low calculated LDL, ApoB levels from previous year just at goal.  - Current cholesterol medications continued, prioritizing LDL and ApoB  management over triglycerides.  - Lifestyle modifications determined as primary approach for triglyceride management rather than medication.    ELEVATED LIPOPROTEIN(A):  - Ordered ApoB test to be completed in 1 month.    MIXED HYPERLIPIDEMIA:  - Explained fenofibrate for triglycerides lacks strong evidence for cardiac risk reduction and can increase risk of statin-related side effects.  - Avoid alcohol consumption, especially before next lab test.  - Focus on weight loss and healthy diet to help normalize triglycerides.  - Continued atorvastatin 40 mg daily for cholesterol with CoQ10 supplementation.  - Ordered metabolic profile to be completed in 1 month.    PRIMARY HYPERTENSION:  - Continued Losartan 50 mg daily for HTN.  - Continued acetazolamide 10 mg daily.    ABNORMAL TRANSAMINASES:  - Contact the office for repeat liver enzyme test results if not already scheduled.  - Avoid alcohol consumption, especially before next lab test.  - Ordered metabolic profile to be completed in 1 month.    GENERAL RECOMMENDATIONS:  - Continue gym attendance and exercise, modify routine to accommodate back issues (e.g., cycling instead of incline walking).  - Follow up in 1 month after completing ordered labs.        Here for follow-up.  He has marked elevation of calcium score percentage wise at least.  He is not having exertional chest pains or tightness.  Blood pressure appears well controlled.  Hyperlipidemia has been well controlled on high-dose statin therapy plus ezetimibe.      I will recheck his liver tests in a month or so to see if they would be persistently elevated, may need to decrease statins if so.  Elevation could also be due to possible gallbladder issues, fatty liver.  Ultrasound to help these decisions.      Hypertriglyceridemia is present.  I would not begin fenofibrate for this, but concentrate on diet and exercise.  Follow up in about 1 year (around 7/9/2026).        Future Appointments   Date Time Provider  [facility]   7/10/2025  8:00 AM [facility] US5 [facility] ULSOUND [place]   7/10/2025  9:00 AM Bernie Parson PA-C [facility] PAINMGT [place]   7/14/2025 10:45 AM Sneha Garibay MD [facility] MARCY [place]   8/11/2025 12:20 PM LAB, [facility] LAB [place]       This note was generated with the assistance of ambient listening technology. Verbal consent was obtained by the patient and accompanying visitor(s) for the recording of patient appointment to facilitate this note. I attest to having reviewed and edited the generated note for accuracy, though some syntax or spelling errors may persist. Please contact the author of this note for any clarification.                      [1]   Patient Active Problem List  Diagnosis    Primary hypertension    Elevated coronary artery calcium score    Cholelithiasis    Former smoker    Mixed hyperlipidemia    Elevated lipoprotein(a)   [2]   Current Outpatient Medications:     ezetimibe (ZETIA) 10 mg tablet, Take 1 tablet (10 mg total) by mouth once daily., Disp: 90 tablet, Rfl: 3    losartan (COZAAR) 50 MG tablet, Take 1 tablet (50 mg total) by mouth once daily., Disp: 90 tablet, Rfl: 0    meloxicam (MOBIC) 15 MG tablet, Take 1 tablet (15 mg total) by mouth daily as needed for Pain., Disp: 30 tablet, Rfl: 0    methocarbamoL (ROBAXIN) 500 MG Tab, Take 1 tablet (500 mg total) by mouth every 6 (six) hours as needed (Muscle Spasm)., Disp: 30 tablet, Rfl: 0    rosuvastatin (CRESTOR) 40 MG Tab, Take 1 tablet (40 mg total) by mouth every evening., Disp: 90 tablet, Rfl: 3    aspirin (ECOTRIN) 81 MG EC tablet, Take 1 tablet (81 mg total) by mouth once daily., Disp: , Rfl:

## 2025-07-10 ENCOUNTER — OFFICE VISIT (OUTPATIENT)
Dept: PAIN MEDICINE | Facility: CLINIC | Age: 35
End: 2025-07-10
Payer: COMMERCIAL

## 2025-07-10 ENCOUNTER — HOSPITAL ENCOUNTER (OUTPATIENT)
Dept: RADIOLOGY | Facility: HOSPITAL | Age: 35
Discharge: HOME | End: 2025-07-10
Attending: PHYSICIAN ASSISTANT
Payer: COMMERCIAL

## 2025-07-10 ENCOUNTER — HOSPITAL ENCOUNTER (OUTPATIENT)
Dept: RADIOLOGY | Facility: HOSPITAL | Age: 35
Discharge: HOME | End: 2025-07-10
Attending: NURSE PRACTITIONER
Payer: COMMERCIAL

## 2025-07-10 VITALS
BODY MASS INDEX: 34.02 KG/M2 | HEIGHT: 70 IN | HEART RATE: 83 BPM | DIASTOLIC BLOOD PRESSURE: 89 MMHG | SYSTOLIC BLOOD PRESSURE: 133 MMHG | WEIGHT: 237.63 LBS

## 2025-07-10 DIAGNOSIS — E78.41 ELEVATED LIPOPROTEIN(A): Chronic | ICD-10-CM

## 2025-07-10 DIAGNOSIS — Z87.891 FORMER SMOKER: ICD-10-CM

## 2025-07-10 DIAGNOSIS — Z82.49 FAMILY HISTORY OF HEART DISEASE: ICD-10-CM

## 2025-07-10 DIAGNOSIS — R74.8 ELEVATED LIVER ENZYMES: ICD-10-CM

## 2025-07-10 DIAGNOSIS — K80.20 GALLSTONES: ICD-10-CM

## 2025-07-10 DIAGNOSIS — M54.50 LOW BACK PAIN WITHOUT SCIATICA, UNSPECIFIED BACK PAIN LATERALITY, UNSPECIFIED CHRONICITY: ICD-10-CM

## 2025-07-10 DIAGNOSIS — R10.84 ABDOMINAL PAIN, COLICKY: ICD-10-CM

## 2025-07-10 DIAGNOSIS — M43.06 SPONDYLOLYSIS OF LUMBAR REGION: ICD-10-CM

## 2025-07-10 DIAGNOSIS — I10 PRIMARY HYPERTENSION: Chronic | ICD-10-CM

## 2025-07-10 DIAGNOSIS — M43.06 SPONDYLOLYSIS OF LUMBAR REGION: Primary | ICD-10-CM

## 2025-07-10 DIAGNOSIS — R93.1 ELEVATED CORONARY ARTERY CALCIUM SCORE: Chronic | ICD-10-CM

## 2025-07-10 DIAGNOSIS — Z00.00 ANNUAL PHYSICAL EXAM: ICD-10-CM

## 2025-07-10 DIAGNOSIS — E78.2 MIXED HYPERLIPIDEMIA: Chronic | ICD-10-CM

## 2025-07-10 PROCEDURE — 72120 X-RAY BEND ONLY L-S SPINE: CPT | Mod: 26,,, | Performed by: RADIOLOGY

## 2025-07-10 PROCEDURE — 76700 US EXAM ABDOM COMPLETE: CPT | Mod: 26,,, | Performed by: STUDENT IN AN ORGANIZED HEALTH CARE EDUCATION/TRAINING PROGRAM

## 2025-07-10 PROCEDURE — 3044F HG A1C LEVEL LT 7.0%: CPT | Mod: CPTII,S$GLB,, | Performed by: PHYSICIAN ASSISTANT

## 2025-07-10 PROCEDURE — 1159F MED LIST DOCD IN RCRD: CPT | Mod: CPTII,S$GLB,, | Performed by: PHYSICIAN ASSISTANT

## 2025-07-10 PROCEDURE — 99203 OFFICE O/P NEW LOW 30 MIN: CPT | Mod: S$GLB,,, | Performed by: PHYSICIAN ASSISTANT

## 2025-07-10 PROCEDURE — 4010F ACE/ARB THERAPY RXD/TAKEN: CPT | Mod: CPTII,S$GLB,, | Performed by: PHYSICIAN ASSISTANT

## 2025-07-10 PROCEDURE — 3079F DIAST BP 80-89 MM HG: CPT | Mod: CPTII,S$GLB,, | Performed by: PHYSICIAN ASSISTANT

## 2025-07-10 PROCEDURE — 3008F BODY MASS INDEX DOCD: CPT | Mod: CPTII,S$GLB,, | Performed by: PHYSICIAN ASSISTANT

## 2025-07-10 PROCEDURE — 72120 X-RAY BEND ONLY L-S SPINE: CPT | Mod: TC,PO

## 2025-07-10 PROCEDURE — 99999 PR PBB SHADOW E&M-EST. PATIENT-LVL IV: CPT | Mod: PBBFAC,,, | Performed by: PHYSICIAN ASSISTANT

## 2025-07-10 PROCEDURE — 76700 US EXAM ABDOM COMPLETE: CPT | Mod: TC,PO

## 2025-07-10 PROCEDURE — 3075F SYST BP GE 130 - 139MM HG: CPT | Mod: CPTII,S$GLB,, | Performed by: PHYSICIAN ASSISTANT

## 2025-07-10 PROCEDURE — 1160F RVW MEDS BY RX/DR IN RCRD: CPT | Mod: CPTII,S$GLB,, | Performed by: PHYSICIAN ASSISTANT

## 2025-07-10 NOTE — PROGRESS NOTES
[facility] New Patient Evaluation      Referred by: Tamy Christopher    PCP: none     CC:   Chief Complaint   Patient presents with    Pain    Low-back Pain    Back Pain    Hip Pain          HPI:   Skip Husain is a 34 y.o. year old male patient who has a past medical history of Cholelithiasis, Elevated coronary artery calcium score, and Viral meningitis. He presents in referral from Tamy Christopher for lower back pain.    Skip presents with chronic lower back pain that has recently worsened. Pain is localized to the lower back, just above the tailbone, with occasional radiation to the pelvis and hips. He denies any pain traveling down his legs. 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 It has intensified over the past month following a new exercise regimen.    Pain is exacerbated by prolonged sitting, particularly while driving, and has become significantly more severe during the commute to work. Recent activities like rolling on the floor with his children have become painful. He reports some relief with stretching and yoga, but pain returns quickly when sitting at a desk.    He started taking Mobic and Robaxin yesterday, which helped with sleep. He has not previously engaged in PT or chiropractic care for his back issues.    He denies any history of spine surgery or epidural steroid injections.      Denies bowel/ bladder incontinence.    Past and current medications:  Antineuropathics:  NSAIDs: mobic  Antidepressants:  Muscle relaxers:  robaxin  Opioids:  Antiplatelets/Anticoagulants:  Others:    Physical Therapy/ Chiropractic care:  Home exercise and yoga    Pain Intervention History:  none    Past Spine Surgical History:  none        History:  Current Medications[1]    Past Medical History:   Diagnosis Date    Cholelithiasis     Elevated coronary artery calcium score     Viral meningitis     11/2023       Past  Surgical History:   Procedure Laterality Date    APPENDECTOMY  2014    HERNIA REPAIR  2017    REPAIR, HERNIA, UMBILICAL      2017       Family History   Problem Relation Name Age of Onset    No Known Problems Mother      Hyperlipidemia Father Torrey SpearsLisha     Heart attack Father Torrey Shafermmons     Heart disease Father Torrey SpearsLisha     Hypertension Father Torrey Husain     No Known Problems Maternal Grandmother      Hypertension Maternal Grandfather Thanh Salinas     Hyperlipidemia Maternal Grandfather Thanh Salinas     Heart disease Maternal Grandfather Thanh [place]     Cancer Maternal Grandfather Thanh Salinas     Stroke Maternal Grandfather Thanh [place]     Cholelithiasis Maternal Grandfather Thanh Salinas     No Known Problems Paternal Grandmother      Diabetes Paternal Grandfather Nakul Shafermmons     Heart attack Paternal Grandfather Nakul Shafermmons     Heart disease Paternal Grandfather Nakul Shafermmons     Hypertension Paternal Grandfather Nakul Husain     Glaucoma Neg Hx      Macular degeneration Neg Hx         Social History     Socioeconomic History    Marital status:    Occupational History    Occupation: RN   Tobacco Use    Smoking status: Former     Current packs/day: 0.00     Types: Cigarettes     Quit date: 2019     Years since quittin.6    Smokeless tobacco: Never   Substance and Sexual Activity    Alcohol use: Yes    Drug use: Never    Sexual activity: Yes     Partners: Female   Social History Narrative    RN. Expecting the birth of their 2nd son on 2024.      Social Drivers of Health     Financial Resource Strain: Low Risk  (2025)    Overall Financial Resource Strain (CARDIA)     Difficulty of Paying Living Expenses: Not hard at all   Food Insecurity: No Food Insecurity (2025)    Hunger Vital Sign     Worried About Running Out of Food in the Last Year: Never true     Ran Out of Food in the Last Year: Never true   Transportation Needs: No  "Transportation Needs (7/8/2025)    PRAPARE - Transportation     Lack of Transportation (Medical): No     Lack of Transportation (Non-Medical): No   Physical Activity: Sufficiently Active (7/8/2025)    Exercise Vital Sign     Days of Exercise per Week: 5 days     Minutes of Exercise per Session: 60 min   Stress: No Stress Concern Present (7/8/2025)    [employer] - Occupational Stress Questionnaire     Feeling of Stress : Not at all   Housing Stability: Low Risk  (7/8/2025)    Housing Stability Vital Sign     Unable to Pay for Housing in the Last Year: No     Homeless in the Last Year: No       Review of patient's allergies indicates:  No Known Allergies    Labs:  Lab Results   Component Value Date    HGBA1C 5.5 07/08/2025       Lab Results   Component Value Date    WBC 5.42 07/08/2025    HGB 14.0 07/08/2025    HCT 40.4 07/08/2025    MCV 87 07/08/2025     07/08/2025           Review of Systems:  Lower back pain.  Balance of review of systems is negative.    Physical Exam:  Vitals:    07/10/25 0850   BP: 133/89   Pulse: 83   Weight: 107.8 kg (237 lb 10.5 oz)   Height: 5' 10" (1.778 m)   PainSc:   6   PainLoc: Back     Body mass index is 34.1 kg/m².    Pain disability index:      7/10/2025     8:49 AM   Last 3 PDI Scores   Pain Disability Index (PDI) 25       Gen: NAD  Psych: mood appropriate for given condition  HEENT: eyes anicteric   CV: RRR, 2+ radial pulse  Respiratory: non-labored, no signs of respiratory distress  Abd: non-distended  Skin: warm, dry and intact.  Gait: Able to heel walk, toe walk. No antalgic gait.     Cervical spine: ROM is full in flexion, extension and lateral rotation without increased pain.  Spurling's maneuver causes no neck pain to either side.  Myofascial exam: No Tenderness to palpation across cervical paraspinous region bilaterally.    Lumbar spine:  Lumbar spine: ROM is full with flexion extension and oblique extension with no increased pain.  "   Lior's test causes no increased pain on either side.    Supine straight leg raise is negative bilaterally.    Internal and external rotation of the hip causes no increased pain on either side.  Myofascial exam: No tenderness to palpation across lumbar paraspinous muscles. No tenderness to palpation over the bilateral greater trochanters and bilateral SI joint    Sensory:  Intact and symmetrical to light touch in C4-T1 dermatomes bilaterally. Intact and symmetrical to light touch in L1-S1 dermatomes bilaterally.    Motor:    Right Left   C4 Shoulder Abduction  5  5   C5 Elbow Flexion    5  5   C6 Wrist Extension  5  5   C7 Elbow Extension   5  5   C8/T1 Hand Intrinsics   5  5        Right Left   L2/3 Iliacus Hip flexion  5  5   L3/4 Qudratus Femoris Knee Extension  5  5   L4/5 Tib Anterior Ankle Dorsiflexion   5  5   L5/S1 Extensor Hallicus Longus Great toe extension  5  5   S1/S2 Gastroc/Soleus Plantar Flexion  5  5      Right Left   Triceps DTR 2+ 2+   Biceps DTR 2+ 2+   Brachioradialis DTR 2+ 2+   Patellar DTR 2+ 2+   Achilles DTR 2+ 2+   Schaffer Absent  Absent   Clonus Absent Absent   Babinski Absent Absent       Imaging:    Xray cervical spine 7-9-25:  FINDINGS:  Cervical spine five views:  Odontoid, prevertebral soft tissues, and posterior elements are intact.  No fracture dislocation bone destruction seen.  The neural foramina are patent.  No trauma seen.  Impression:  No acute process seen.    Xray lumbar spine 7-9-25:  FINDINGS:  Lumbar spine complete five views.  There is a chronic left L5 pars defect.  Alignment is normal.  No acute fracture dislocation bone destruction seen.  No acute trauma seen.  Impression:  Chronic left L5 pars defect.      Assessment:     Mr. Valdivia has chronic lumbar back pain without radiculopathy nor neurological deficits.  Pain can be myofacial and associated with presence of L5 pars defect.    PLAN SUMMARY:  - XR Lower Back Flexion and Extension ordered to assess stability;  we will notify with imaging results.  - Continue activities as tolerated,   - Take meloxicam in the morning or daytime hours  - Take muscle relaxant (Robaxin) in the evening or at bedtime  - Do not drive while taking muscle relaxant  - Referred to [facility] program for muscular strengthening and flexibility  - Recommend activities: walking, yoga, Pilates-style stretching, swimming, and biking    Problem List Items Addressed This Visit    None  Visit Diagnoses         Spondylolysis of lumbar region    -  Primary    Relevant Orders    Ambulatory referral/consult to [facility]    X-Ray Lumbar Spine Flexion And Extension Only (Completed)      Low back pain without sciatica, unspecified back pain laterality, unspecified chronicity        Relevant Orders    Ambulatory referral/consult to [facility]    X-Ray Lumbar Spine Flexion And Extension Only (Completed)            Follow Up: RTC as needed    : Reviewed and consistent with medication use as prescribed.    Thank you for referring this interesting patient, and I look forward to continuing to collaborate in his care.        Bernie Parson PA-C  [facility]    This note was generated with the assistance of ambient listening technology. Verbal consent was obtained by the patient and accompanying visitor(s) for the recording of patient appointment to facilitate this note. I attest to having reviewed and edited the generated note for accuracy, though some syntax or spelling errors may persist. Please contact the author of this note for any clarification.            [1]   Current Outpatient Medications:     ezetimibe (ZETIA) 10 mg tablet, Take 1 tablet (10 mg total) by mouth once daily., Disp: 90 tablet, Rfl: 3    losartan (COZAAR) 50 MG tablet, Take 1 tablet (50 mg total) by mouth once daily., Disp: 90 tablet, Rfl: 0    meloxicam (MOBIC) 15 MG tablet, Take 1 tablet (15 mg total) by mouth daily as needed for Pain., Disp: 30  tablet, Rfl: 0    methocarbamoL (ROBAXIN) 500 MG Tab, Take 1 tablet (500 mg total) by mouth every 6 (six) hours as needed (Muscle Spasm)., Disp: 30 tablet, Rfl: 0    rosuvastatin (CRESTOR) 40 MG Tab, Take 1 tablet (40 mg total) by mouth every evening., Disp: 90 tablet, Rfl: 3    aspirin (ECOTRIN) 81 MG EC tablet, Take 1 tablet (81 mg total) by mouth once daily., Disp: , Rfl:

## 2025-07-14 ENCOUNTER — OFFICE VISIT (OUTPATIENT)
Dept: SURGERY | Facility: CLINIC | Age: 35
End: 2025-07-14
Payer: COMMERCIAL

## 2025-07-14 VITALS
BODY MASS INDEX: 33.74 KG/M2 | HEIGHT: 70 IN | SYSTOLIC BLOOD PRESSURE: 144 MMHG | OXYGEN SATURATION: 99 % | HEART RATE: 75 BPM | DIASTOLIC BLOOD PRESSURE: 90 MMHG | WEIGHT: 235.69 LBS

## 2025-07-14 DIAGNOSIS — R10.84 ABDOMINAL PAIN, COLICKY: ICD-10-CM

## 2025-07-14 DIAGNOSIS — K80.20 GALLSTONES: ICD-10-CM

## 2025-07-14 DIAGNOSIS — K80.20 CALCULUS OF GALLBLADDER WITHOUT CHOLECYSTITIS WITHOUT OBSTRUCTION: Primary | ICD-10-CM

## 2025-07-14 DIAGNOSIS — R93.1 ELEVATED CORONARY ARTERY CALCIUM SCORE: Chronic | ICD-10-CM

## 2025-07-14 DIAGNOSIS — K76.0 HEPATIC STEATOSIS: ICD-10-CM

## 2025-07-14 PROCEDURE — 3080F DIAST BP >= 90 MM HG: CPT | Mod: CPTII,S$GLB,, | Performed by: SURGERY

## 2025-07-14 PROCEDURE — 3077F SYST BP >= 140 MM HG: CPT | Mod: CPTII,S$GLB,, | Performed by: SURGERY

## 2025-07-14 PROCEDURE — 3008F BODY MASS INDEX DOCD: CPT | Mod: CPTII,S$GLB,, | Performed by: SURGERY

## 2025-07-14 PROCEDURE — 99204 OFFICE O/P NEW MOD 45 MIN: CPT | Mod: S$GLB,,, | Performed by: SURGERY

## 2025-07-14 PROCEDURE — 1159F MED LIST DOCD IN RCRD: CPT | Mod: CPTII,S$GLB,, | Performed by: SURGERY

## 2025-07-14 PROCEDURE — 99999 PR PBB SHADOW E&M-EST. PATIENT-LVL III: CPT | Mod: PBBFAC,,, | Performed by: SURGERY

## 2025-07-14 PROCEDURE — 3044F HG A1C LEVEL LT 7.0%: CPT | Mod: CPTII,S$GLB,, | Performed by: SURGERY

## 2025-07-14 PROCEDURE — 4010F ACE/ARB THERAPY RXD/TAKEN: CPT | Mod: CPTII,S$GLB,, | Performed by: SURGERY

## 2025-07-14 NOTE — PROGRESS NOTES
[facility] H&P    Subjective:     Skip Husain is a 34 y.o. male with PMH significant for fatty liver disease and cholelithiasis who presents to clinic for surgical evaluation for cholecystectomy. He was first diagnosed with gallstones in 2017, but remained asymptomatic until 2023 when he had an episode of RUQ pain lasting 2 days that he presented to the ED for. His pain resolved in the ED and he did not pursue surgery at that time. Recently, while on vacation for 4th of July, he had recurring symptoms of sharp shooting pains in the RUQ lasting seconds or crampy discomfort lasting up to 30 minutes associated with food. On his vacation he endorses eating primarily fatty foods including pizza/burgers/hot dogs and drinking at the beach. The last episode occurred on about July 6th, and he has not had recurrence since returning from vacation and improving his diet.     He is also interested in a liver biopsy, since he was first diagnosed with fatty liver in 2017 and it remains present on his recent US.    PMH:   Past Medical History:   Diagnosis Date    Cholelithiasis     Elevated coronary artery calcium score     Viral meningitis     11/2023       Past Surgical History:   Past Surgical History:   Procedure Laterality Date    APPENDECTOMY  8/2014 2014    HERNIA REPAIR  6/2017    REPAIR, HERNIA, UMBILICAL      6/2017       Social History:Social History[1]    Allergies: Review of patient's allergies indicates:  No Known Allergies    Medications:  Medications Ordered Prior to Encounter[2]      Objective:     Vital Signs (Most Recent)  Pulse: 75 (07/14/25 1025)  BP: (!) 144/90 (07/14/25 1025)  SpO2: 99 % (07/14/25 1025)    ROS A 10+ review of systems was performed with pertinent positives and negatives noted above in the history of present illness.  Other systems were negative unless otherwise specified.    Physical Exam:  Gen: awake, alert, in no acute distress  HEENT: normocephalic,  atraumatic, EOMI, no scleral icterus  CV: regular rate and rhythm  Pulm: equal chest rise bilaterally, normal work of breathing  Abd:  soft, non-tender, no guarding  Ext: WWP, skin warm and dry    Imaging  The following imaging was reviewed:   Abdominal US  - Liver: 14.2 cm, normal in size. Diffusely hyperechoic parenchyma consistent with steatosis.  No focal lesions.  - Gallbladder: Multiple gallstones measuring up to 1.2 cm.  No wall thickening, or pericholecystic fluid.  Negative sonographic Wynn's sign.  - Biliary system: 4 mm common bile duct.  No intrahepatic ductal dilatation.    Assessment:     Skip Husain is a 34 y.o. male with PMH significant for fatty liver disease and cholelithiasis who presents to clinic for surgical evaluation for cholecystectomy.     Plan:     - Consent signed for robo cholecystectomy with liver biopsy  - His umbilical hernia repair in 2017 was with [facility], so the umbilicus will not be used for a trocar incision      Palak García MD   General Surgery PGY-1         [1]   Social History  Socioeconomic History    Marital status:    Occupational History    Occupation: RN   Tobacco Use    Smoking status: Former     Current packs/day: 0.00     Types: Cigarettes     Quit date: 2019     Years since quittin.6    Smokeless tobacco: Never   Substance and Sexual Activity    Alcohol use: Yes    Drug use: Never    Sexual activity: Yes     Partners: Female   Social History Narrative    RN. Expecting the birth of their 2nd son on 2024.      Social Drivers of Health     Financial Resource Strain: Low Risk  (2025)    Overall Financial Resource Strain (CARDIA)     Difficulty of Paying Living Expenses: Not hard at all   Food Insecurity: No Food Insecurity (2025)    Hunger Vital Sign     Worried About Running Out of Food in the Last Year: Never true     Ran Out of Food in the Last Year: Never true   Transportation Needs: No Transportation Needs (2025)    PRAPARE -  Transportation     Lack of Transportation (Medical): No     Lack of Transportation (Non-Medical): No   Physical Activity: Sufficiently Active (7/8/2025)    Exercise Vital Sign     Days of Exercise per Week: 5 days     Minutes of Exercise per Session: 60 min   Stress: No Stress Concern Present (7/8/2025)    [employer] [place] [employer] - Occupational Stress Questionnaire     Feeling of Stress : Not at all   Housing Stability: Low Risk  (7/8/2025)    Housing Stability Vital Sign     Unable to Pay for Housing in the Last Year: No     Homeless in the Last Year: No   [2]   Current Outpatient Medications on File Prior to Visit   Medication Sig Dispense Refill    ezetimibe (ZETIA) 10 mg tablet Take 1 tablet (10 mg total) by mouth once daily. 90 tablet 3    losartan (COZAAR) 50 MG tablet Take 1 tablet (50 mg total) by mouth once daily. 90 tablet 0    meloxicam (MOBIC) 15 MG tablet Take 1 tablet (15 mg total) by mouth daily as needed for Pain. 30 tablet 0    methocarbamoL (ROBAXIN) 500 MG Tab Take 1 tablet (500 mg total) by mouth every 6 (six) hours as needed (Muscle Spasm). 30 tablet 0    rosuvastatin (CRESTOR) 40 MG Tab Take 1 tablet (40 mg total) by mouth every evening. 90 tablet 3    aspirin (ECOTRIN) 81 MG EC tablet Take 1 tablet (81 mg total) by mouth once daily.       No current facility-administered medications on file prior to visit.

## 2025-07-15 ENCOUNTER — TELEPHONE (OUTPATIENT)
Dept: PAIN MEDICINE | Facility: CLINIC | Age: 35
End: 2025-07-15
Payer: COMMERCIAL

## 2025-07-15 NOTE — TELEPHONE ENCOUNTER
----- Message from Bernie Parson PA-C sent at 7/11/2025  4:30 PM CDT -----  Results Reviewed.  Please call with below:    Xray lumbar spine reviewed.  The L5 pars defect we discussed in clinic is again seen.  There is no instability of the spine associated with it.  Proceed with healthy back PT.   ----- Message -----  From: Pretty, Rad Results In  Sent: 7/10/2025  10:04 AM CDT  To: Bernie Parson PA-C

## 2025-07-16 ENCOUNTER — PATIENT MESSAGE (OUTPATIENT)
Dept: SURGERY | Facility: CLINIC | Age: 35
End: 2025-07-16
Payer: COMMERCIAL

## 2025-07-18 DIAGNOSIS — K80.20 GALLSTONES: Primary | ICD-10-CM

## 2025-07-24 ENCOUNTER — CLINICAL SUPPORT (OUTPATIENT)
Dept: REHABILITATION | Facility: HOSPITAL | Age: 35
End: 2025-07-24
Payer: COMMERCIAL

## 2025-07-24 DIAGNOSIS — M54.50 LOW BACK PAIN WITHOUT SCIATICA, UNSPECIFIED BACK PAIN LATERALITY, UNSPECIFIED CHRONICITY: ICD-10-CM

## 2025-07-24 DIAGNOSIS — M54.50 CHRONIC MIDLINE LOW BACK PAIN WITHOUT SCIATICA: Primary | ICD-10-CM

## 2025-07-24 DIAGNOSIS — R29.898 DECREASED STRENGTH OF TRUNK AND BACK: ICD-10-CM

## 2025-07-24 DIAGNOSIS — G89.29 CHRONIC MIDLINE LOW BACK PAIN WITHOUT SCIATICA: Primary | ICD-10-CM

## 2025-07-24 DIAGNOSIS — M25.69 DECREASED ROM OF TRUNK AND BACK: ICD-10-CM

## 2025-07-24 DIAGNOSIS — M43.06 SPONDYLOLYSIS OF LUMBAR REGION: ICD-10-CM

## 2025-07-24 PROCEDURE — 97750 PHYSICAL PERFORMANCE TEST: CPT | Mod: 32,PO | Performed by: PHYSICAL THERAPIST

## 2025-07-24 NOTE — PROGRESS NOTES
Outpatient Rehab    Physical Therapy Evaluation- Healthy Back    Patient Name: Skip Husain  MRN: [id number]  YOB: 1990  Encounter Date: 7/24/2025    Therapy Diagnosis:   Encounter Diagnoses   Name Primary?    Low back pain without sciatica, unspecified back pain laterality, unspecified chronicity     Spondylolysis of lumbar region     Chronic midline low back pain without sciatica Yes    Decreased strength of trunk and back     Decreased ROM of trunk and back      Physician: Bernie Parson PA-C    Physician Orders: Eval and Treat  Medical Diagnosis: Low back pain without sciatica, unspecified back pain laterality, unspecified chronicity  Spondylolysis of lumbar region  Surgical Diagnosis: Not applicable for this Episode   Surgical Date: Not applicable for this Episode  Days Since Last Surgery: Not applicable for this Episode    Visit # / Visits Authorized:  1 / 1  Insurance Authorization Period: 7/10/2025 to 7/10/2026  Date of Evaluation: 7/24/2025  Plan of Care Certification: 7/24/2025 to 10/16/25     Time In: 0900   Time Out: 1000  Total Time (in minutes): 60   Total Billable Time (in minutes): 60    Intake Outcome Measure for FOTO Survey    Therapist reviewed FOTO scores for Skip Husain on 7/24/2025.   FOTO report - see Media section or FOTO account episode details.     Intake Score (%): 72    Precautions:       Subjective   History of Present Illness  Skip is a 34 y.o. male who reports to physical therapy with a chief concern of midline low back pain worse with sitting and bending.     The patient reports a medical diagnosis of Diagnosis  M54.50 (ICD-10-CM) - Low back pain without sciatica, unspecified back pain laterality, unspecified chronicity  M43.06 (ICD-10-CM) - Spondylolysis of lumbar region.    Diagnostic tests related to this condition: X-ray.   X-Ray Details: 7/10/25:Chronic left-sided pars defect at L5 better assessed on prior oblique films 07/09/2025.  Sagittal alignment is  maintained without evidence of any dynamic instability.  Vertebral body heights are preserved.  No major degenerative disc height loss.    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 More recently, he is an organ  for [employer] and sometimes sits for 16 hours/day. In June he started working out at [employer]. Within 2 weeks he started having back pain again. Pain increases with sitting, bending, lifting, playing with children on floor. Meloxicam seems to have helped. He continues with walking on treadmill and stretching, but has held off on weights at gym for now. Unfortunately, he is scheduled for gall bladder removal on September 3rd.         Past Medical History/Physical Systems Review:   Skip Husain  has a past medical history of Cholelithiasis, Elevated coronary artery calcium score, and Viral meningitis.    Skip Husain  has a past surgical history that includes Appendectomy (8/2014); repair, hernia, umbilical; and Hernia repair (6/2017).    Skip has a current medication list which includes the following prescription(s): aspirin, ezetimibe, losartan, meloxicam, and rosuvastatin.    Review of patient's allergies indicates:  No Known Allergies       Prior Therapy: no  Prior Treatment: none  Social History:   lives with their spouse, 2 children  Occupation: nurse organ   Leisure: play with children, pool, gym, play golf, fish      Prior Level of Function: able to sit, bend, lift without difficulty  Current Level of Function: difficulty with prolonged sitting, bending and lifting  DME owned/used: no  Gym Membership: [employer]    Pain:  Current 2/10, worst 7/10, best 0/10   Location: midline low back, sometimes into buttock   Description: Aching, Tight, and Shooting  Aggravating Factors: Sitting, Bending,  Lifting, and tying golf  Easing Factors: stretching, standing, walking  Disturbed Sleep: no    Pattern of pain questions:  1.  Where is your pain the worst? Midline low back  2.  Is your pain constant or intermittent? intermittent  3.  Does bending forward make your typical pain worse? yes  4.  Since the start of your back pain, has there been a change in your bowel or bladder? no  5.  What can't you do now that you use to be able to do?     Pts goals: strengthen back, relieve pain, learn better mechanics    Red Flag Screening:   Cough/Sneeze Strain: (--)  Bladder/Bowel: (--)  Falls: (--)  Night pain: (--)  Unexplained weight loss: (--)  General health: HTN, hyperlipidemia (controlled with meds), gall bladder stones      Objective          Postural examination/scapula alignment: Rounded shoulder, Head forward, and Decreased lordosis  Joint integrity: mild lower lumbar restriction  Skin integrity: normal  Edema: no  Sitting: flexed  Standing: as above  Correction of posture: better with lumbar roll    MOVEMENT LOSS    ROM Loss   Flexion minimal loss   Extension minimal loss   Side bending Right within functional limits   Side bending Left within functional limits   Rotation Right within functional limits   Rotation Left within functional limits     Lower Extremity Strength  Right LE  Left LE    Hip flexion: 5/5 Hip flexion: 5/5   Hip extension:  5/5 Hip extension: 5/5   Hip abduction: 5/5 Hip abduction: 5/5   Hip adduction:  5/5 Hip adduction:  5/5   Hip Internal rotation   5/5 Hip Internal rotation 5/5   Knee Flexion 5/5 Knee Flexion 5/5   Knee Extension 5/5 Knee Extension 5/5   Ankle dorsiflexion: 5/5 Ankle dorsiflexion: 5/5   Ankle plantarflexion: 5/5 Ankle plantarflexion: 5/5       GAIT:  Assistive Device used: none  Level of Assistance: independent  Patient displays the following gait deviations:  no gait deviations observed.     Special Tests:   Test Name  Test Result   Prone Instability Test (--)   SI Joint  Provocation Test (--)   Straight Leg Raise (--)   Neural Tension Test (--)   Crossed Straight Leg Raise (--)   Walking on toes able   Walking on heels  able       NEUROLOGICAL SCREENING     Sensory deficit: no    Reflexes:    Left Right   Patella Tendon 2+ 2+   Achilles Tendon 2+ 2+   Babinski  (--) (--)   Clonus (--) (--)     REPEATED TEST MOVEMENTS:  Repeated Flexion in Standing worse   Repeated Extension in Standing better   Repeated Flexion in lying worse   Repeated Extension in lying  better       STATIC TESTS   Sitting slouched  worse   Sitting erect better   Standing slouched no effect   Standing erect  better   Lying prone in extension  better   Long sitting          Treatment:  Therapeutic Exercise  TE 2: prone on elbows 2 min  TE 3: press ups 2/10  TE 4: bridging 2/10  TE 5: standing extension 10x  Therapeutic Activity  TA 1: - Patient was given an [facility] Healthy [facility] Visit 1 handout which discusses the following:  - what to expect in therapy  - an overview of the program, including health coaching and wellness  - importance of spinal hygiene, proper posture, lifting mechanics, sleep quality, and nutrition/hydration   - Eleazar roll trialed, recommended, and purchase information was provided.  - Patient received a handout regarding anticipated muscular soreness following the isometric test and strategies for management were reviewed with patient including stretching, using ice and scheduled rest.   - Patient received verbal education on the following:   - Healthy Back program,   - purpose of the isometric test,   - safe progression of back or neck strengthening, wellness approach, and systemic strengthening.   - safe usage of MedX machine and testing protocols.        7/24/2025     1:24 PM   HealthyBack Therapy   Visit Number 1   VAS Pain Rating 2   Extension in Lying 20   Extension in Standing 10       Time Entry(in minutes):  Healthy Back Evaluation Time Entry: 60    Assessment & Plan    Assessment  Skip presents with a condition of Moderate complexity.   Presentation of Symptoms: Changing  Will Comorbidities Impact Care: Yes       Functional Limitations: Activity tolerance, Carrying objects, Participating in leisure activities, Functional mobility, Pain with ADLs/IADLs, Range of motion, Sitting tolerance, Squatting  Impairments: Activity intolerance, Impaired physical strength, Pain with functional activity, Lack of appropriate home exercise program, Abnormal or restricted range of motion  Personal Factors Affecting Prognosis: Physical limitations    Patient Goal for Therapy (PT): See Patient Specific Functional Scale for 3 goals in FOTO  Prognosis: Good  Assessment Details: Pt presents with reported midline low back pain  that is reproduced repetitive and sustained flexion activities and reduced with extension indicating directional preference of extension.   Upon physical assessment, pt demonstrates issues with postural habits with sitting.  Mobility issues minimal loss of lumbar ROM in flexion and extension and hip tightness. Strength issues include decreased back and core strength. Isometric back strength will be assessed next visit.   All of the above noted supports potential  classification as a pattern 1 with recurrent/ or consistent symptoms, thus pt is a good candidate for the Healthy Back Program. Pt would benefit from comprehensive UE and LE and trunk mobility training, stability training,  improved cardiovascular and muscular endurance, neuromuscular re-education for posture, coordination, and muscular recruitment and education on positional offloading techniques to decrease the intensity and frequency of flare-ups.    Plan  From a physical therapy perspective, the patient would benefit from: Skilled Rehab Services    Planned therapy interventions include: Therapeutic exercise, Therapeutic activities, Neuromuscular re-education, Manual therapy, ADLs/IADLs, and Sensory integration.             Visit Frequency: 2 times Per Week for 10 Weeks.       This plan was discussed with Patient.   Discussion participants: Agreed Upon Plan of Care  Plan details: Healthy Back protocol 2/week for isolated medx strengthening, whole body strengthening, therapy, manual skills and modalities as needed          The patient's spiritual, cultural, and educational needs were considered, and the patient is agreeable to the plan of care and goals.     Education  Education was done with Patient. The patient's learning style includes Demonstration and Listening. The patient Demonstrates understanding.         Home exercises include:  prone on elbows, press ups, bridging, standing extension;  Cardio program (V5): -  Lifting education (V11): -  Posture/Lumbar roll: instruct visit 1;  Fridge Magnet Discharge handout (date given): -  Equipment at home/gym membership: yes, [place]    Education provided:   - PT role and POC  - HEP         Goals:   Active       A. Short term goals       - Pt will demonstrate increased lumbar MedX ROM by at least 3 degrees from the initial ROM value with improvements noted in functional ROM and ability to perform ADLs. Appropriate and Ongoing        Start:  07/24/25    Expected End:  09/04/25            - Pt will demonstrate increased MedX average isometric strength value by 20% from initial test resulting in improved ability to perform bending, lifting, and carrying activities safely, confidently. Appropriate and Ongoing        Start:  07/24/25    Expected End:  09/04/25            - Pt will report a reduction in worst pain score by 1-2 points for improved tolerance for sitting and bending. Appropriate and Ongoing        Start:  07/24/25    Expected End:  09/04/25            - Pt able to perform HEP correctly with minimal cueing or supervision from therapist to encourage independent management of symptoms. Appropriate and Ongoing        Start:  07/24/25    Expected End:  09/04/25                B. Long term goals       - Pt will demonstrate increased lumbar MedX ROM by at least 6 degrees from initial ROM value, resulting in improved ability to perform functional forward bending while standing and sitting. Appropriate and Ongoing        Start:  07/24/25    Expected End:  10/16/25            - Pt will demonstrate increased MedX average isometric strength value by and additional 10% from mid test resulting in improved ability to perform bending, lifting, and carrying activities safely and confidently. Appropriate and Ongoing        Start:  07/24/25    Expected End:  10/16/25            - Pt to demonstrate ability to independently control and reduce their pain through posture positioning and mechanical movements throughout a typical day. Appropriate and Ongoing        Start:  07/24/25    Expected End:  10/16/25            - Pt will demonstrate reduced pain and improved functional outcomes as reported on the FOTO by reaching an intake score improvement of 6% functional ability in order to demonstrate subjective improvement in patient's condition.        Start:  07/24/25    Expected End:  10/16/25            - Pt will demonstrate independence with the HEP at discharge. Appropriate and Ongoing        Start:  07/24/25    Expected End:  10/16/25                Pratibha Smalls PT

## 2025-07-28 ENCOUNTER — CLINICAL SUPPORT (OUTPATIENT)
Dept: REHABILITATION | Facility: HOSPITAL | Age: 35
End: 2025-07-28
Payer: COMMERCIAL

## 2025-07-28 DIAGNOSIS — G89.29 CHRONIC MIDLINE LOW BACK PAIN WITHOUT SCIATICA: Primary | ICD-10-CM

## 2025-07-28 DIAGNOSIS — M25.69 DECREASED ROM OF TRUNK AND BACK: ICD-10-CM

## 2025-07-28 DIAGNOSIS — M54.50 CHRONIC MIDLINE LOW BACK PAIN WITHOUT SCIATICA: Primary | ICD-10-CM

## 2025-07-28 DIAGNOSIS — R29.898 DECREASED STRENGTH OF TRUNK AND BACK: ICD-10-CM

## 2025-07-28 PROCEDURE — 97750 PHYSICAL PERFORMANCE TEST: CPT | Mod: 32,PO | Performed by: PHYSICAL THERAPIST

## 2025-07-28 NOTE — PROGRESS NOTES
Outpatient Rehab    Physical Therapy Visit    Patient Name: Skip Husain  MRN: [id number]  YOB: 1990  Encounter Date: 7/28/2025    Therapy Diagnosis:   Encounter Diagnoses   Name Primary?    Chronic midline low back pain without sciatica Yes    Decreased strength of trunk and back     Decreased ROM of trunk and back      Physician: Bernie Parson PA-C    Physician Orders: Eval and Treat  Medical Diagnosis: Low back pain without sciatica, unspecified back pain laterality, unspecified chronicity  Spondylolysis of lumbar region  Surgical Diagnosis: Not applicable for this Episode   Surgical Date: Not applicable for this Episode  Days Since Last Surgery: Not applicable for this Episode    Visit # / Visits Authorized:  1 / 10  Insurance Authorization Period: 7/10/2025 to 12/31/2025  Date of Evaluation: 7/24/2025  Plan of Care Certification:       PT/PTA:     Number of PTA visits since last PT visit:   Time In: 1447   Time Out: 1540  Total Time (in minutes): 53   Total Billable Time (in minutes): 53    FOTO:  Intake Score (%): 72  Survey Score 2 (%): Not applicable for this Episode  Survey Score 3 (%): Not applicable for this Episode    Precautions:         Subjective   He feels stretches help as he has decreased pain after doing them. No pain currently..  Pain reported as 0/10. Location: midline low back, sometimes into buttock    Objective          Isometric Testing on Med X equipment: Testing administered by PT    Test Initial Midpoint Final   Spine area:lumbar      Date 7/28/25     ROM 0-45 deg     Max Peak Torque 183      Min Peak Torque 150      Flex/Ext Ratio 1.2:1     % below normative data -15%     % gain from initial test Not available visit 1           Treatment:  Therapeutic Exercise  TE 1: Patient participated in therapeutic exercises to develop strength, balance, coordination  including: Peripheral muscle strengthening which included one set of 15-20 repetitions at a slow and controlled  10-13 second per rep pace focused on strengthening supporting musculature in order to improve body mechanics and functional mobility. Patient and therapist focused on proper form during treatment to ensure optimal strengthening of each targeted muscle group. Machines utilized included: torso rotation, leg press, hip abduction, hip adduction, chest press, rows, leg curl, leg extension, biceps curls, triceps elbow extension.  TE 2: prone on elbows 2 min  TE 3: press ups 2/10  TE 4: bridging 2/10  TE 5: standing extension 10x  TE 6: treadmill 10 min  Balance/Neuromuscular Re-Education  NMR 1: MedX testing: Patient  received neuromuscular education to engage spinal musculature correctly for motor control and engagement of musculature including the MedX exercise component and practice and standard testing. MedX dynamic exercise and baseline isometric test performed with instructions to guide the patient safely through the testing procedure. Patient instructed to perform isometric test correctly and safely while building to an optimal force with a pain-free effort. Patient also instructed that they should feel support/pressure from MedX restraints but no pain/discomfort, and encouraged to report any pain to therapist. Patient demonstrated appropriate understanding of information and tolerance of test.  Education regarding purpose of test, safety during test given, and reviewed possible more soreness and strategies.        7/28/2025     2:58 PM   [facility]   Visit Number 2   VAS Pain Rating 0   Treadmill Time (in min.) 10 min   Extension in Lying 20   Extension in Standing 10   Lumbar Extension Seat Pad 0   Femur Restraint 6   Top Dead Center 24   Counterweight 211   Lumbar Flexion 45   Lumbar Extension 0   Lumbar Peak Torque 183 ft. lbs.   Min Torque 150   Test Percent Below Normative Data 15 %       Time Entry(in minutes):  Healthy Back Evaluation Time Entry: 53    Assessment & Plan   Assessment: Patient  presents to second healthy back visit reporting decreased pain, was able to demo HEP with Min VC for form. Pt was able to tolerate Medical MedX machine well as follows:  MedX testing performed and patient tolerated test well.   Strength results from testing reveal his lumbar strength is 15% below normative data with isometric testing indicating patient is a good candidate for healthy back.  MedX testing tolerated well.  Pt was also able to complete half of the peripheral strengthening exercises without increased discomfort and will complete the complete circuit next visit as tolerated.       The patient will continue to benefit from skilled outpatient physical therapy in order to address the deficits listed in the problem list on the initial evaluation, provide patient and family education, and maximize the patients level of independence in the home and community environments.     The patient's spiritual, cultural, and educational needs were considered, and the patient is agreeable to the plan of care and goals.     Education             Home exercises include:  prone on elbows, press ups, bridging, standing extension;  Cardio program (V5): -  Lifting education (V11): -  Posture/Lumbar roll: instruct visit 1;  Fridge Magnet Discharge handout (date given): -  Equipment at home/gym membership: yes, [employer]    Education provided:   - PT role and POC  - HEP         Plan: Continue per POC. Progressing toward established goals.    Goals:   Active       A. Short term goals       - Pt will demonstrate increased lumbar MedX ROM by at least 3 degrees from the initial ROM value with improvements noted in functional ROM and ability to perform ADLs. Appropriate and Ongoing        Start:  07/24/25    Expected End:  09/04/25            - Pt will demonstrate increased MedX average isometric strength value by 20% from initial test resulting in improved ability to perform bending, lifting, and carrying activities safely,  confidently. Appropriate and Ongoing        Start:  07/24/25    Expected End:  09/04/25            - Pt will report a reduction in worst pain score by 1-2 points for improved tolerance for sitting and bending. Appropriate and Ongoing        Start:  07/24/25    Expected End:  09/04/25            - Pt able to perform HEP correctly with minimal cueing or supervision from therapist to encourage independent management of symptoms. Appropriate and Ongoing        Start:  07/24/25    Expected End:  09/04/25               B. Long term goals       - Pt will demonstrate increased lumbar MedX ROM by at least 6 degrees from initial ROM value, resulting in improved ability to perform functional forward bending while standing and sitting. Appropriate and Ongoing        Start:  07/24/25    Expected End:  10/16/25            - Pt will demonstrate increased MedX average isometric strength value by and additional 10% from mid test resulting in improved ability to perform bending, lifting, and carrying activities safely and confidently. Appropriate and Ongoing        Start:  07/24/25    Expected End:  10/16/25            - Pt to demonstrate ability to independently control and reduce their pain through posture positioning and mechanical movements throughout a typical day. Appropriate and Ongoing        Start:  07/24/25    Expected End:  10/16/25            - Pt will demonstrate reduced pain and improved functional outcomes as reported on the FOTO by reaching an intake score improvement of 6% functional ability in order to demonstrate subjective improvement in patient's condition.        Start:  07/24/25    Expected End:  10/16/25            - Pt will demonstrate independence with the HEP at discharge. Appropriate and Ongoing        Start:  07/24/25    Expected End:  10/16/25                Pratibha Smalls PT

## 2025-07-30 ENCOUNTER — CLINICAL SUPPORT (OUTPATIENT)
Dept: REHABILITATION | Facility: HOSPITAL | Age: 35
End: 2025-07-30
Payer: COMMERCIAL

## 2025-07-30 DIAGNOSIS — M54.50 CHRONIC MIDLINE LOW BACK PAIN WITHOUT SCIATICA: Primary | ICD-10-CM

## 2025-07-30 DIAGNOSIS — R29.898 DECREASED STRENGTH OF TRUNK AND BACK: ICD-10-CM

## 2025-07-30 DIAGNOSIS — G89.29 CHRONIC MIDLINE LOW BACK PAIN WITHOUT SCIATICA: Primary | ICD-10-CM

## 2025-07-30 DIAGNOSIS — M25.69 DECREASED ROM OF TRUNK AND BACK: ICD-10-CM

## 2025-07-30 PROCEDURE — 97750 PHYSICAL PERFORMANCE TEST: CPT | Mod: 32,PO | Performed by: PHYSICAL THERAPIST

## 2025-07-30 NOTE — PATIENT INSTRUCTIONS
HAMSTRING STRETCH WITH TOWEL    While lying down on your back, hook a towel or strap under  your foot and draw up your leg until a stretch is felt along the backside of your leg.     Keep your knee in a straightened position during the stretch. Hold 30 seconds. Repeat 3x on each leg.      Modified Gluteal Stretch    Place ankle on opposite knee.  Push on knee until a comfortable stretch is felt.Hold 30 seconds. Repeat 3x on each leg.      PIRIFORMIS STRETCH - MODIFIED    While lying on your back, hold your knee with your opposite hand and draw your knee up and over towards your opposite shoulder.Hold 30 seconds. Repeat 3x on each leg.

## 2025-07-30 NOTE — PROGRESS NOTES
Outpatient Rehab    Physical Therapy Visit- Healthy [facility]    Patient Name: Skip Husain  MRN: [id number]  YOB: 1990  Encounter Date: 7/30/2025    Therapy Diagnosis:   Encounter Diagnoses   Name Primary?    Chronic midline low back pain without sciatica Yes    Decreased strength of trunk and back     Decreased ROM of trunk and back      Physician: Bernie Parson PA-C    Physician Orders: Eval and Treat  Medical Diagnosis: Low back pain without sciatica, unspecified back pain laterality, unspecified chronicity  Spondylolysis of lumbar region  Surgical Diagnosis: Not applicable for this Episode   Surgical Date: Not applicable for this Episode  Days Since Last Surgery: Not applicable for this Episode    Visit # / Visits Authorized:  2 / 10  Insurance Authorization Period: 7/10/2025 to 12/31/2025  Date of Evaluation: 7/24/2025  Plan of Care Certification:       PT/PTA:     Number of PTA visits since last PT visit:   Time In: 1050   Time Out: 1148  Total Time (in minutes): 58   Total Billable Time (in minutes): 58    FOTO:  Intake Score (%): 72  Survey Score 2 (%): Not applicable for this Episode  Survey Score 3 (%): Not applicable for this Episode    Precautions:         Subjective   Stretching into extension has helped. He has no back pain currently..  Pain reported as 0/10. Location: midline low back, sometimes into buttock    Objective          Isometric Testing on Med X equipment: Testing administered by PT    Test Initial Midpoint Final   Spine area:lumbar      Date 7/28/25     ROM 0-45 deg     Max Peak Torque 183      Min Peak Torque 150      Flex/Ext Ratio 1.2:1     % below normative data -15%     % gain from initial test Not available visit 1           Treatment:  Therapeutic Exercise  TE 1: Patient participated in therapeutic exercises to develop strength, balance, coordination  including: Peripheral muscle strengthening which included one set of 15-20 repetitions at a slow and controlled  10-13 second per rep pace focused on strengthening supporting musculature in order to improve body mechanics and functional mobility. Patient and therapist focused on proper form during treatment to ensure optimal strengthening of each targeted muscle group. Machines utilized included: torso rotation, leg press, hip abduction, hip adduction, chest press, rows, leg curl, leg extension, biceps curls, triceps elbow extension.  TE 2: prone on elbows 2 min  TE 3: press ups 2/10  TE 4: bridging 2/10  TE 5: standing extension 10x  TE 6: treadmill 10 min  TE 7: hamstring stretch 3x 30 sec  TE 8: glute stretch 3x 30 sec ea  TE 9: piriformis stretch 3x 30 sec ea  Balance/Neuromuscular Re-Education  NMR 1: MedX exercise :  Patient received neuromuscular education via participation on the Medical MedX Machine. Therapist assisted patient in isolating and engaging spinal stabilization musculature in order to improve functional ability and postural control. Patient performed exercise with therapist guidance in order to accurately use pacer function, avoid valsalva, and optimally exert effort within a safe and effective range via the Celia Exertion Rating Scale. Patient instructed to perform at a midrange of exertion and to complete 15-20 repetitions within appropriate split time, with proper technique, and while maintaining safety.        7/30/2025    11:19 AM   [facility]   Visit Number 3   VAS Pain Rating 0   Treadmill Time (in min.) 10 min   Extension in Lying 20   Extension in Standing 10   Lumbar Weight 80 lbs   Repetitions 15   Rating of Perceived Exertion 4         Time Entry(in minutes):  Healthy Back Evaluation Time Entry: 58    Assessment & Plan   Assessment: Pt presents to healthy back visit 3 reporting no low back pain, was able to demo HEP with Min VC for form. Treatment continued with flexibility, strengthening and neuromuscular reeducation ex's. Initiated neuromuscular reeducation on MedX resistance  at 80  ft/lbs and 15 reps were completed at 4 /10 RPE. Peripheral strengthening ex's without c/o. Will continue per HB protocol and patient tolerance.       The patient will continue to benefit from skilled outpatient physical therapy in order to address the deficits listed in the problem list on the initial evaluation, provide patient and family education, and maximize the patients level of independence in the home and community environments.     The patient's spiritual, cultural, and educational needs were considered, and the patient is agreeable to the plan of care and goals.     Education             Home exercises include:  prone on elbows, press ups, bridging, standing extension;  Cardio program (V5): -  Lifting education (V11): -  Posture/Lumbar roll: instruct visit 1;  Fridge Magnet Discharge handout (date given): -  Equipment at home/gym membership: yes, [employer]    Education provided:   - PT role and POC  - HEP         Plan: Continue per POC. Progressing toward established goals.    Goals:   Active       A. Short term goals       - Pt will demonstrate increased lumbar MedX ROM by at least 3 degrees from the initial ROM value with improvements noted in functional ROM and ability to perform ADLs. Appropriate and Ongoing        Start:  07/24/25    Expected End:  09/04/25            - Pt will demonstrate increased MedX average isometric strength value by 20% from initial test resulting in improved ability to perform bending, lifting, and carrying activities safely, confidently. Appropriate and Ongoing        Start:  07/24/25    Expected End:  09/04/25            - Pt will report a reduction in worst pain score by 1-2 points for improved tolerance for sitting and bending. Appropriate and Ongoing        Start:  07/24/25    Expected End:  09/04/25            - Pt able to perform HEP correctly with minimal cueing or supervision from therapist to encourage independent management of symptoms. Appropriate and Ongoing         Start:  07/24/25    Expected End:  09/04/25               B. Long term goals       - Pt will demonstrate increased lumbar MedX ROM by at least 6 degrees from initial ROM value, resulting in improved ability to perform functional forward bending while standing and sitting. Appropriate and Ongoing        Start:  07/24/25    Expected End:  10/16/25            - Pt will demonstrate increased MedX average isometric strength value by and additional 10% from mid test resulting in improved ability to perform bending, lifting, and carrying activities safely and confidently. Appropriate and Ongoing        Start:  07/24/25    Expected End:  10/16/25            - Pt to demonstrate ability to independently control and reduce their pain through posture positioning and mechanical movements throughout a typical day. Appropriate and Ongoing        Start:  07/24/25    Expected End:  10/16/25            - Pt will demonstrate reduced pain and improved functional outcomes as reported on the FOTO by reaching an intake score improvement of 6% functional ability in order to demonstrate subjective improvement in patient's condition.        Start:  07/24/25    Expected End:  10/16/25            - Pt will demonstrate independence with the HEP at discharge. Appropriate and Ongoing        Start:  07/24/25    Expected End:  10/16/25                Pratibha Smalls PT

## 2025-08-06 ENCOUNTER — CLINICAL SUPPORT (OUTPATIENT)
Dept: REHABILITATION | Facility: HOSPITAL | Age: 35
End: 2025-08-06
Payer: COMMERCIAL

## 2025-08-06 DIAGNOSIS — G89.29 CHRONIC MIDLINE LOW BACK PAIN WITHOUT SCIATICA: Primary | ICD-10-CM

## 2025-08-06 DIAGNOSIS — M25.69 DECREASED ROM OF TRUNK AND BACK: ICD-10-CM

## 2025-08-06 DIAGNOSIS — R29.898 DECREASED STRENGTH OF TRUNK AND BACK: ICD-10-CM

## 2025-08-06 DIAGNOSIS — M54.50 CHRONIC MIDLINE LOW BACK PAIN WITHOUT SCIATICA: Primary | ICD-10-CM

## 2025-08-06 PROCEDURE — 97750 PHYSICAL PERFORMANCE TEST: CPT | Mod: 32,PO

## 2025-08-06 NOTE — PROGRESS NOTES
Outpatient Rehab    Physical Therapy Visit    Patient Name: Skip Husain  MRN: [id number]  YOB: 1990  Encounter Date: 8/6/2025    Therapy Diagnosis:   Encounter Diagnoses   Name Primary?    Chronic midline low back pain without sciatica Yes    Decreased strength of trunk and back     Decreased ROM of trunk and back      Physician: Bernie Parson PA-C    Physician Orders: Eval and Treat  Medical Diagnosis: Low back pain without sciatica, unspecified back pain laterality, unspecified chronicity  Spondylolysis of lumbar region  Surgical Diagnosis: Not applicable for this Episode   Surgical Date: Not applicable for this Episode  Days Since Last Surgery: Not applicable for this Episode    Visit # / Visits Authorized:  3 / 10  Insurance Authorization Period: 7/10/2025 to 12/31/2025  Date of Evaluation: 7/24/2025  Plan of Care Certification:       PT/PTA:     Number of PTA visits since last PT visit:   Time In: 1450   Time Out: 1545  Total Time (in minutes): 55   Total Billable Time (in minutes): 55    FOTO:  Intake Score (%): 72  Survey Score 2 (%): Not applicable for this Episode  Survey Score 3 (%): Not applicable for this Episode    Precautions:         Subjective   Skip is w/o c/o back pn upon arrival today. States that he is performing HEP exs and using lumbar roll in chair and car to help manage sx with good effect..  Pain reported as 0/10.      Objective            Treatment:  Therapeutic Exercise  TE 1: Patient participated in therapeutic exercises to develop strength, balance, coordination  including: Peripheral muscle strengthening which included one set of 15-20 repetitions at a slow and controlled 10-13 second per rep pace focused on strengthening supporting musculature in order to improve body mechanics and functional mobility. Patient and therapist focused on proper form during treatment to ensure optimal strengthening of each targeted muscle group. Machines utilized included: torso  rotation, leg press, hip abduction, hip adduction, chest press, rows, leg curl, leg extension, biceps curls, triceps elbow extension.      Time Entry(in minutes):  Neuromuscular Re-Education Time Entry: 15  Therapeutic Exercise Time Entry: 40    Assessment & Plan   Assessment: Skip noyola today's tx with progression of ther ex and neuromuscular re-ed well. He was progressed with resistance @ 20 reps on MEDX with a high PE today. He was progressed with resistance at 20 reps on peripheral machines with low PE with the exception of Bicep and Tricep exs. Will continue with HB protocol and pt jazmín.   Evaluation/Treatment Tolerance: Patient tolerated treatment well    The patient will continue to benefit from skilled outpatient physical therapy in order to address the deficits listed in the problem list on the initial evaluation, provide patient and family education, and maximize the patients level of independence in the home and community environments.     The patient's spiritual, cultural, and educational needs were considered, and the patient is agreeable to the plan of care and goals.           Plan: Continue per POC. Progressing toward established goals.    Goals:   Active       A. Short term goals       - Pt will demonstrate increased lumbar MedX ROM by at least 3 degrees from the initial ROM value with improvements noted in functional ROM and ability to perform ADLs. Appropriate and Ongoing        Start:  07/24/25    Expected End:  09/04/25            - Pt will demonstrate increased MedX average isometric strength value by 20% from initial test resulting in improved ability to perform bending, lifting, and carrying activities safely, confidently. Appropriate and Ongoing        Start:  07/24/25    Expected End:  09/04/25            - Pt will report a reduction in worst pain score by 1-2 points for improved tolerance for sitting and bending. Appropriate and Ongoing        Start:  07/24/25    Expected End:  09/04/25             - Pt able to perform HEP correctly with minimal cueing or supervision from therapist to encourage independent management of symptoms. Appropriate and Ongoing        Start:  07/24/25    Expected End:  09/04/25               B. Long term goals       - Pt will demonstrate increased lumbar MedX ROM by at least 6 degrees from initial ROM value, resulting in improved ability to perform functional forward bending while standing and sitting. Appropriate and Ongoing        Start:  07/24/25    Expected End:  10/16/25            - Pt will demonstrate increased MedX average isometric strength value by and additional 10% from mid test resulting in improved ability to perform bending, lifting, and carrying activities safely and confidently. Appropriate and Ongoing        Start:  07/24/25    Expected End:  10/16/25            - Pt to demonstrate ability to independently control and reduce their pain through posture positioning and mechanical movements throughout a typical day. Appropriate and Ongoing        Start:  07/24/25    Expected End:  10/16/25            - Pt will demonstrate reduced pain and improved functional outcomes as reported on the FOTO by reaching an intake score improvement of 6% functional ability in order to demonstrate subjective improvement in patient's condition.        Start:  07/24/25    Expected End:  10/16/25            - Pt will demonstrate independence with the HEP at discharge. Appropriate and Ongoing        Start:  07/24/25    Expected End:  10/16/25                Librado Dougherty PTA

## 2025-08-08 ENCOUNTER — CLINICAL SUPPORT (OUTPATIENT)
Dept: REHABILITATION | Facility: HOSPITAL | Age: 35
End: 2025-08-08
Payer: COMMERCIAL

## 2025-08-08 DIAGNOSIS — R29.898 DECREASED STRENGTH OF TRUNK AND BACK: ICD-10-CM

## 2025-08-08 DIAGNOSIS — G89.29 CHRONIC MIDLINE LOW BACK PAIN WITHOUT SCIATICA: Primary | ICD-10-CM

## 2025-08-08 DIAGNOSIS — M25.69 DECREASED ROM OF TRUNK AND BACK: ICD-10-CM

## 2025-08-08 DIAGNOSIS — M54.50 CHRONIC MIDLINE LOW BACK PAIN WITHOUT SCIATICA: Primary | ICD-10-CM

## 2025-08-08 PROCEDURE — 97750 PHYSICAL PERFORMANCE TEST: CPT | Mod: 32,PO | Performed by: PHYSICAL THERAPIST

## 2025-08-08 NOTE — PROGRESS NOTES
Outpatient Rehab    Physical Therapy Visit- [facility]    Patient Name: Skip Husain  MRN: [id number]  YOB: 1990  Encounter Date: 8/8/2025    Therapy Diagnosis:   Encounter Diagnoses   Name Primary?    Chronic midline low back pain without sciatica Yes    Decreased strength of trunk and back     Decreased ROM of trunk and back      Physician: Bernie Parson PA-C    Physician Orders: Eval and Treat  Medical Diagnosis: Low back pain without sciatica, unspecified back pain laterality, unspecified chronicity  Spondylolysis of lumbar region  Surgical Diagnosis: Not applicable for this Episode   Surgical Date: Not applicable for this Episode  Days Since Last Surgery: Not applicable for this Episode    Visit # / Visits Authorized:  4 / 10  Insurance Authorization Period: 7/10/2025 to 12/31/2025  Date of Evaluation: 7/24/2025  Plan of Care Certification:       PT/PTA:     Number of PTA visits since last PT visit:   Time In: 1100   Time Out: 1200  Total Time (in minutes): 60   Total Billable Time (in minutes): 45    FOTO:  Intake Score (%): 72  Survey Score 2 (%): 72  Survey Score 3 (%): Not applicable for this Episode    Precautions:         Subjective   Pt drove to [facility] without back pain with use of lumbar support. He did have some pain on way back, but could have been related to golf. Overall he is aware of decreased back pain and stiffness and ability to stretch to resolve it..  Pain reported as 0/10. Location: midline low back, sometimes into buttock    Objective          Isometric Testing on Med X equipment: Testing administered by PT    Test Initial Midpoint Final   Spine area:lumbar      Date 7/28/25     ROM 0-45 deg     Max Peak Torque 183      Min Peak Torque 150      Flex/Ext Ratio 1.2:1     % below normative data -15%     % gain from initial test Not available visit 1           Treatment:  Therapeutic Exercise  TE 1: Patient participated in therapeutic exercises to develop strength,  balance, coordination  including: Peripheral muscle strengthening which included one set of 15-20 repetitions at a slow and controlled 10-13 second per rep pace focused on strengthening supporting musculature in order to improve body mechanics and functional mobility. Patient and therapist focused on proper form during treatment to ensure optimal strengthening of each targeted muscle group. Machines utilized included: torso rotation, leg press, hip abduction, hip adduction, chest press, rows, leg curl, leg extension, biceps curls, triceps elbow extension.  TE 2: prone on elbows 2 min  TE 3: press ups 2/10  TE 4: bridging 2/10  TE 5: standing extension 10x  TE 6: treadmill 10 min  TE 7: hamstring stretch 3x 30 sec  TE 8: glute stretch 3x 30 sec ea  TE 9: piriformis stretch 3x 30 sec ea  Balance/Neuromuscular Re-Education  NMR 1: MedX exercise :  Patient received neuromuscular education via participation on the Medical MedX Machine. Therapist assisted patient in isolating and engaging spinal stabilization musculature in order to improve functional ability and postural control. Patient performed exercise with therapist guidance in order to accurately use pacer function, avoid valsalva, and optimally exert effort within a safe and effective range via the Celia Exertion Rating Scale. Patient instructed to perform at a midrange of exertion and to complete 15-20 repetitions within appropriate split time, with proper technique, and while maintaining safety.      8/8/2025    11:20 AM   [facility]   Visit Number 5   VAS Pain Rating 0   Treadmill Time (in min.) 10 min   Extension in Lying 20   Extension in Standing 10   Lumbar Weight 80 lbs   Repetitions 16   Rating of Perceived Exertion 6             Time Entry(in minutes):  [facility] Evaluation Time Entry: 60    Assessment & Plan   Assessment: Pt had difficulty completing 20 reps at 80 ft-lbs today on MEDX, completing only 16 secondary to muscle fatigue. May  reduce resistance next visit to improve endurance with greater repetitions.       The patient will continue to benefit from skilled outpatient physical therapy in order to address the deficits listed in the problem list on the initial evaluation, provide patient and family education, and maximize the patients level of independence in the home and community environments.     The patient's spiritual, cultural, and educational needs were considered, and the patient is agreeable to the plan of care and goals.     Education             Home exercises include:  prone on elbows, press ups, bridging, standing extension, hamstring stretch, glute stretch, piriformis stretch;  Cardio program (V5):8/8/25 -  Lifting education (V11): -  Posture/Lumbar roll: instruct visit 1;  Fridge Magnet Discharge handout (date given): -  Equipment at home/gym membership: yes, [place]    Education provided:   - PT role and POC  - HEP         Plan: Continue per POC. Progressing toward established goals.    Goals:   Active       A. Short term goals       - Pt will demonstrate increased lumbar MedX ROM by at least 3 degrees from the initial ROM value with improvements noted in functional ROM and ability to perform ADLs. Appropriate and Ongoing        Start:  07/24/25    Expected End:  09/04/25            - Pt will demonstrate increased MedX average isometric strength value by 20% from initial test resulting in improved ability to perform bending, lifting, and carrying activities safely, confidently. Appropriate and Ongoing        Start:  07/24/25    Expected End:  09/04/25            - Pt will report a reduction in worst pain score by 1-2 points for improved tolerance for sitting and bending. Appropriate and Ongoing        Start:  07/24/25    Expected End:  09/04/25            - Pt able to perform HEP correctly with minimal cueing or supervision from therapist to encourage independent management of symptoms. Appropriate and Ongoing         Start:  07/24/25    Expected End:  09/04/25               B. Long term goals       - Pt will demonstrate increased lumbar MedX ROM by at least 6 degrees from initial ROM value, resulting in improved ability to perform functional forward bending while standing and sitting. Appropriate and Ongoing        Start:  07/24/25    Expected End:  10/16/25            - Pt will demonstrate increased MedX average isometric strength value by and additional 10% from mid test resulting in improved ability to perform bending, lifting, and carrying activities safely and confidently. Appropriate and Ongoing        Start:  07/24/25    Expected End:  10/16/25            - Pt to demonstrate ability to independently control and reduce their pain through posture positioning and mechanical movements throughout a typical day. Appropriate and Ongoing        Start:  07/24/25    Expected End:  10/16/25            - Pt will demonstrate reduced pain and improved functional outcomes as reported on the FOTO by reaching an intake score improvement of 6% functional ability in order to demonstrate subjective improvement in patient's condition.        Start:  07/24/25    Expected End:  10/16/25            - Pt will demonstrate independence with the HEP at discharge. Appropriate and Ongoing        Start:  07/24/25    Expected End:  10/16/25                Pratibha Smalls PT

## 2025-08-11 ENCOUNTER — LAB VISIT (OUTPATIENT)
Dept: LAB | Facility: HOSPITAL | Age: 35
End: 2025-08-11
Attending: INTERNAL MEDICINE
Payer: COMMERCIAL

## 2025-08-11 DIAGNOSIS — R74.8 ABNORMAL TRANSAMINASES: ICD-10-CM

## 2025-08-11 DIAGNOSIS — E78.2 MIXED HYPERLIPIDEMIA: Chronic | ICD-10-CM

## 2025-08-11 LAB
ALBUMIN SERPL BCP-MCNC: 5.1 G/DL (ref 3.5–5.2)
ALP SERPL-CCNC: 30 UNIT/L (ref 40–150)
ALT SERPL W/O P-5'-P-CCNC: 132 UNIT/L (ref 0–55)
ANION GAP (OHS): 11 MMOL/L (ref 8–16)
AST SERPL-CCNC: 76 UNIT/L (ref 0–50)
BILIRUB SERPL-MCNC: 1 MG/DL (ref 0.1–1)
BUN SERPL-MCNC: 14 MG/DL (ref 6–20)
CALCIUM SERPL-MCNC: 10.4 MG/DL (ref 8.7–10.5)
CHLORIDE SERPL-SCNC: 106 MMOL/L (ref 95–110)
CO2 SERPL-SCNC: 26 MMOL/L (ref 23–29)
CREAT SERPL-MCNC: 1 MG/DL (ref 0.5–1.4)
GFR SERPLBLD CREATININE-BSD FMLA CKD-EPI: >60 ML/MIN/1.73/M2
GLUCOSE SERPL-MCNC: 92 MG/DL (ref 70–110)
POTASSIUM SERPL-SCNC: 4.5 MMOL/L (ref 3.5–5.1)
PROT SERPL-MCNC: 8 GM/DL (ref 6–8.4)
SODIUM SERPL-SCNC: 143 MMOL/L (ref 136–145)

## 2025-08-11 PROCEDURE — 36415 COLL VENOUS BLD VENIPUNCTURE: CPT | Mod: PO

## 2025-08-11 PROCEDURE — 82172 ASSAY OF APOLIPOPROTEIN: CPT

## 2025-08-11 PROCEDURE — 82040 ASSAY OF SERUM ALBUMIN: CPT

## 2025-08-13 LAB — APO B SERPL-MCNC: 64 MG/DL

## 2025-08-18 ENCOUNTER — CLINICAL SUPPORT (OUTPATIENT)
Dept: REHABILITATION | Facility: HOSPITAL | Age: 35
End: 2025-08-18
Payer: COMMERCIAL

## 2025-08-18 DIAGNOSIS — M54.50 CHRONIC MIDLINE LOW BACK PAIN WITHOUT SCIATICA: Primary | ICD-10-CM

## 2025-08-18 DIAGNOSIS — M25.69 DECREASED ROM OF TRUNK AND BACK: ICD-10-CM

## 2025-08-18 DIAGNOSIS — R29.898 DECREASED STRENGTH OF TRUNK AND BACK: ICD-10-CM

## 2025-08-18 DIAGNOSIS — G89.29 CHRONIC MIDLINE LOW BACK PAIN WITHOUT SCIATICA: Primary | ICD-10-CM

## 2025-08-18 PROCEDURE — 97750 PHYSICAL PERFORMANCE TEST: CPT | Mod: 32,PO | Performed by: PHYSICAL THERAPIST

## 2025-08-20 ENCOUNTER — PATIENT MESSAGE (OUTPATIENT)
Dept: SURGERY | Facility: CLINIC | Age: 35
End: 2025-08-20
Payer: COMMERCIAL

## 2025-08-21 ENCOUNTER — CLINICAL SUPPORT (OUTPATIENT)
Dept: REHABILITATION | Facility: HOSPITAL | Age: 35
End: 2025-08-21
Payer: COMMERCIAL

## 2025-08-21 DIAGNOSIS — M54.50 CHRONIC MIDLINE LOW BACK PAIN WITHOUT SCIATICA: Primary | ICD-10-CM

## 2025-08-21 DIAGNOSIS — R29.898 DECREASED STRENGTH OF TRUNK AND BACK: ICD-10-CM

## 2025-08-21 DIAGNOSIS — G89.29 CHRONIC MIDLINE LOW BACK PAIN WITHOUT SCIATICA: Primary | ICD-10-CM

## 2025-08-21 DIAGNOSIS — M25.69 DECREASED ROM OF TRUNK AND BACK: ICD-10-CM

## 2025-08-21 PROCEDURE — 97750 PHYSICAL PERFORMANCE TEST: CPT | Mod: 32,PO

## 2025-08-27 ENCOUNTER — CLINICAL SUPPORT (OUTPATIENT)
Dept: REHABILITATION | Facility: HOSPITAL | Age: 35
End: 2025-08-27
Payer: COMMERCIAL

## 2025-08-27 DIAGNOSIS — M54.50 CHRONIC MIDLINE LOW BACK PAIN WITHOUT SCIATICA: Primary | ICD-10-CM

## 2025-08-27 DIAGNOSIS — M25.69 DECREASED ROM OF TRUNK AND BACK: ICD-10-CM

## 2025-08-27 DIAGNOSIS — R29.898 DECREASED STRENGTH OF TRUNK AND BACK: ICD-10-CM

## 2025-08-27 DIAGNOSIS — G89.29 CHRONIC MIDLINE LOW BACK PAIN WITHOUT SCIATICA: Primary | ICD-10-CM

## 2025-08-27 PROCEDURE — 97750 PHYSICAL PERFORMANCE TEST: CPT | Mod: 32,PO | Performed by: PHYSICAL THERAPIST

## 2025-08-29 ENCOUNTER — CLINICAL SUPPORT (OUTPATIENT)
Dept: REHABILITATION | Facility: HOSPITAL | Age: 35
End: 2025-08-29
Payer: COMMERCIAL

## 2025-08-29 DIAGNOSIS — G89.29 CHRONIC MIDLINE LOW BACK PAIN WITHOUT SCIATICA: Primary | ICD-10-CM

## 2025-08-29 DIAGNOSIS — M25.69 DECREASED ROM OF TRUNK AND BACK: ICD-10-CM

## 2025-08-29 DIAGNOSIS — M54.50 CHRONIC MIDLINE LOW BACK PAIN WITHOUT SCIATICA: Primary | ICD-10-CM

## 2025-08-29 DIAGNOSIS — R29.898 DECREASED STRENGTH OF TRUNK AND BACK: ICD-10-CM

## 2025-08-29 PROCEDURE — 97750 PHYSICAL PERFORMANCE TEST: CPT | Mod: 32,PO | Performed by: PHYSICAL THERAPIST

## 2025-09-03 ENCOUNTER — ANESTHESIA EVENT (OUTPATIENT)
Dept: SURGERY | Facility: HOSPITAL | Age: 35
End: 2025-09-03
Payer: COMMERCIAL

## 2025-09-03 ENCOUNTER — ANESTHESIA (OUTPATIENT)
Dept: SURGERY | Facility: HOSPITAL | Age: 35
End: 2025-09-03
Payer: COMMERCIAL

## 2025-09-03 PROBLEM — Z90.49 S/P LAPAROSCOPIC CHOLECYSTECTOMY: Status: ACTIVE | Noted: 2025-09-03

## 2025-09-03 PROBLEM — K80.20 CHOLELITHIASIS: Status: RESOLVED | Noted: 2024-05-07 | Resolved: 2025-09-03

## 2025-09-03 PROCEDURE — 63600175 PHARM REV CODE 636 W HCPCS: Performed by: NURSE ANESTHETIST, CERTIFIED REGISTERED

## 2025-09-03 PROCEDURE — 25000003 PHARM REV CODE 250: Performed by: NURSE ANESTHETIST, CERTIFIED REGISTERED

## 2025-09-03 PROCEDURE — 63600175 PHARM REV CODE 636 W HCPCS

## 2025-09-03 RX ORDER — ROCURONIUM BROMIDE 10 MG/ML
INJECTION, SOLUTION INTRAVENOUS
Status: DISCONTINUED | OUTPATIENT
Start: 2025-09-03 | End: 2025-09-03

## 2025-09-03 RX ORDER — ONDANSETRON HYDROCHLORIDE 2 MG/ML
INJECTION, SOLUTION INTRAVENOUS
Status: DISCONTINUED | OUTPATIENT
Start: 2025-09-03 | End: 2025-09-03

## 2025-09-03 RX ORDER — DEXMEDETOMIDINE HYDROCHLORIDE 100 UG/ML
INJECTION, SOLUTION INTRAVENOUS
Status: DISCONTINUED | OUTPATIENT
Start: 2025-09-03 | End: 2025-09-03

## 2025-09-03 RX ORDER — DEXAMETHASONE SODIUM PHOSPHATE 4 MG/ML
INJECTION, SOLUTION INTRA-ARTICULAR; INTRALESIONAL; INTRAMUSCULAR; INTRAVENOUS; SOFT TISSUE
Status: DISCONTINUED | OUTPATIENT
Start: 2025-09-03 | End: 2025-09-03

## 2025-09-03 RX ORDER — FENTANYL CITRATE 50 UG/ML
INJECTION, SOLUTION INTRAMUSCULAR; INTRAVENOUS
Status: DISCONTINUED | OUTPATIENT
Start: 2025-09-03 | End: 2025-09-03

## 2025-09-03 RX ORDER — PROPOFOL 10 MG/ML
VIAL (ML) INTRAVENOUS
Status: DISCONTINUED | OUTPATIENT
Start: 2025-09-03 | End: 2025-09-03

## 2025-09-03 RX ORDER — KETOROLAC TROMETHAMINE 30 MG/ML
INJECTION, SOLUTION INTRAMUSCULAR; INTRAVENOUS
Status: DISCONTINUED | OUTPATIENT
Start: 2025-09-03 | End: 2025-09-03

## 2025-09-03 RX ORDER — LIDOCAINE HYDROCHLORIDE 20 MG/ML
INJECTION INTRAVENOUS
Status: DISCONTINUED | OUTPATIENT
Start: 2025-09-03 | End: 2025-09-03

## 2025-09-03 RX ORDER — MIDAZOLAM HYDROCHLORIDE 1 MG/ML
INJECTION, SOLUTION INTRAMUSCULAR; INTRAVENOUS
Status: DISCONTINUED | OUTPATIENT
Start: 2025-09-03 | End: 2025-09-03

## 2025-09-03 RX ADMIN — FENTANYL CITRATE 75 MCG: 50 INJECTION, SOLUTION INTRAMUSCULAR; INTRAVENOUS at 07:09

## 2025-09-03 RX ADMIN — PROPOFOL 200 MG: 10 INJECTION, EMULSION INTRAVENOUS at 07:09

## 2025-09-03 RX ADMIN — SUGAMMADEX 200 MG: 100 INJECTION, SOLUTION INTRAVENOUS at 08:09

## 2025-09-03 RX ADMIN — SODIUM CHLORIDE: 0.9 INJECTION, SOLUTION INTRAVENOUS at 06:09

## 2025-09-03 RX ADMIN — MIDAZOLAM HYDROCHLORIDE 2 MG: 2 INJECTION, SOLUTION INTRAMUSCULAR; INTRAVENOUS at 07:09

## 2025-09-03 RX ADMIN — ROCURONIUM BROMIDE 20 MG: 10 INJECTION INTRAVENOUS at 07:09

## 2025-09-03 RX ADMIN — DEXMEDETOMIDINE HYDROCHLORIDE 4 MCG: 100 INJECTION, SOLUTION INTRAVENOUS at 08:09

## 2025-09-03 RX ADMIN — FENTANYL CITRATE 25 MCG: 50 INJECTION, SOLUTION INTRAMUSCULAR; INTRAVENOUS at 06:09

## 2025-09-03 RX ADMIN — LIDOCAINE HYDROCHLORIDE 100 MG: 20 INJECTION INTRAVENOUS at 07:09

## 2025-09-03 RX ADMIN — ONDANSETRON 4 MG: 2 INJECTION INTRAMUSCULAR; INTRAVENOUS at 07:09

## 2025-09-03 RX ADMIN — KETOROLAC TROMETHAMINE 30 MG: 30 INJECTION, SOLUTION INTRAMUSCULAR; INTRAVENOUS at 08:09

## 2025-09-03 RX ADMIN — SODIUM CHLORIDE: 0.9 INJECTION, SOLUTION INTRAVENOUS at 07:09

## 2025-09-03 RX ADMIN — FENTANYL CITRATE 50 MCG: 50 INJECTION, SOLUTION INTRAMUSCULAR; INTRAVENOUS at 07:09

## 2025-09-03 RX ADMIN — DEXAMETHASONE SODIUM PHOSPHATE 4 MG: 4 INJECTION INTRA-ARTICULAR; INTRALESIONAL; INTRAMUSCULAR; INTRAVENOUS; SOFT TISSUE at 07:09

## 2025-09-03 RX ADMIN — MIDAZOLAM HYDROCHLORIDE 2 MG: 2 INJECTION, SOLUTION INTRAMUSCULAR; INTRAVENOUS at 06:09

## 2025-09-03 RX ADMIN — DEXMEDETOMIDINE HYDROCHLORIDE 12 MCG: 100 INJECTION, SOLUTION INTRAVENOUS at 07:09

## 2025-09-03 RX ADMIN — ROCURONIUM BROMIDE 50 MG: 10 INJECTION INTRAVENOUS at 07:09

## 2025-09-03 RX ADMIN — CEFAZOLIN 2 G: 2 INJECTION, POWDER, FOR SOLUTION INTRAMUSCULAR; INTRAVENOUS at 07:09

## 2025-09-05 ENCOUNTER — TELEPHONE (OUTPATIENT)
Dept: SURGERY | Facility: CLINIC | Age: 35
End: 2025-09-05
Payer: COMMERCIAL